# Patient Record
Sex: MALE | Race: BLACK OR AFRICAN AMERICAN | URBAN - METROPOLITAN AREA
[De-identification: names, ages, dates, MRNs, and addresses within clinical notes are randomized per-mention and may not be internally consistent; named-entity substitution may affect disease eponyms.]

---

## 2020-11-23 ENCOUNTER — OFFICE VISIT (OUTPATIENT)
Dept: PRIMARY CARE CLINIC | Age: 25
End: 2020-11-23
Payer: COMMERCIAL

## 2020-11-23 VITALS
OXYGEN SATURATION: 99 % | WEIGHT: 179 LBS | HEIGHT: 71 IN | BODY MASS INDEX: 25.06 KG/M2 | DIASTOLIC BLOOD PRESSURE: 61 MMHG | TEMPERATURE: 97.5 F | HEART RATE: 66 BPM | SYSTOLIC BLOOD PRESSURE: 126 MMHG

## 2020-11-23 PROCEDURE — 99213 OFFICE O/P EST LOW 20 MIN: CPT | Performed by: STUDENT IN AN ORGANIZED HEALTH CARE EDUCATION/TRAINING PROGRAM

## 2020-11-23 PROCEDURE — 99395 PREV VISIT EST AGE 18-39: CPT | Performed by: STUDENT IN AN ORGANIZED HEALTH CARE EDUCATION/TRAINING PROGRAM

## 2020-11-23 RX ORDER — MELOXICAM 15 MG/1
15 TABLET ORAL DAILY
Qty: 30 TABLET | Refills: 3 | Status: SHIPPED | OUTPATIENT
Start: 2020-11-23 | End: 2021-02-24 | Stop reason: SDUPTHER

## 2020-11-23 ASSESSMENT — ENCOUNTER SYMPTOMS
SHORTNESS OF BREATH: 0
BLOOD IN STOOL: 0
COUGH: 0
BACK PAIN: 0

## 2020-11-23 ASSESSMENT — PATIENT HEALTH QUESTIONNAIRE - PHQ9
SUM OF ALL RESPONSES TO PHQ9 QUESTIONS 1 & 2: 0
SUM OF ALL RESPONSES TO PHQ QUESTIONS 1-9: 0
SUM OF ALL RESPONSES TO PHQ QUESTIONS 1-9: 0
1. LITTLE INTEREST OR PLEASURE IN DOING THINGS: 0
2. FEELING DOWN, DEPRESSED OR HOPELESS: 0
SUM OF ALL RESPONSES TO PHQ QUESTIONS 1-9: 0

## 2020-11-23 NOTE — PROGRESS NOTES
2020    Mckenzie Young (:  1995) is a 23 y.o. male, here for evaluation of the following medical concerns:    HPI    Well Adult Physical: Patient here for a comprehensive physical exam.The patient reports problems -right lateral leg pain  Do you take any herbs or supplements that were not prescribed by a doctor? no Are you taking calcium supplements? no Are you taking aspirin daily? no    Sexual activity: single partner, contraception - condoms   Diet: Healthy  Exercise: aerobics 4 time(s) per week  Seatbelt use: yes    Review of Systems   Constitutional: Negative for activity change, fatigue and unexpected weight change. HENT: Negative for hearing loss. Eyes: Negative for visual disturbance. Respiratory: Negative for cough and shortness of breath. Cardiovascular: Negative for chest pain and leg swelling. Gastrointestinal: Negative for blood in stool. Endocrine: Negative for polydipsia and polyphagia. Genitourinary: Negative for dysuria and frequency. Musculoskeletal: Positive for arthralgias (R leg pain). Negative for back pain and neck pain. Skin: Negative for rash. Allergic/Immunologic: Negative for environmental allergies. Neurological: Negative for dizziness, weakness, numbness and headaches. Hematological: Does not bruise/bleed easily. Psychiatric/Behavioral: Negative for dysphoric mood and sleep disturbance. The patient is not nervous/anxious. Prior to Visit Medications    Medication Sig Taking? Authorizing Provider   meloxicam (MOBIC) 15 MG tablet Take 1 tablet by mouth daily Yes Primitivo Porras, DO        No Known Allergies    History reviewed. No pertinent past medical history. History reviewed. No pertinent surgical history.     Social History     Socioeconomic History    Marital status: Single     Spouse name: Not on file    Number of children: Not on file    Years of education: Not on file    Highest education level: Not on file   Occupational History    Not on file   Social Needs    Financial resource strain: Not on file    Food insecurity     Worry: Not on file     Inability: Not on file    Transportation needs     Medical: Not on file     Non-medical: Not on file   Tobacco Use    Smoking status: Never Smoker    Smokeless tobacco: Never Used   Substance and Sexual Activity    Alcohol use: Yes     Comment: occ    Drug use: Never    Sexual activity: Not Currently   Lifestyle    Physical activity     Days per week: Not on file     Minutes per session: Not on file    Stress: Not on file   Relationships    Social connections     Talks on phone: Not on file     Gets together: Not on file     Attends [employer] service: Not on file     Active member of club or organization: Not on file     Attends meetings of clubs or organizations: Not on file     Relationship status: Not on file    Intimate partner violence     Fear of current or ex partner: Not on file     Emotionally abused: Not on file     Physically abused: Not on file     Forced sexual activity: Not on file   Other Topics Concern    Not on file   Social History Narrative    Not on file        History reviewed. No pertinent family history. Vitals:    11/23/20 0751   BP: 126/61   Pulse: 66   Temp: 97.5 °F (36.4 °C)   TempSrc: Oral   SpO2: 99%   Weight: 179 lb (81.2 kg)   Height: 5' 10.5\" (1.791 m)     Estimated body mass index is 25.32 kg/m² as calculated from the following:    Height as of this encounter: 5' 10.5\" (1.791 m). Weight as of this encounter: 179 lb (81.2 kg). Physical Exam  Vitals signs reviewed. Constitutional:       Appearance: Normal appearance. He is normal weight. HENT:      Head: Normocephalic and atraumatic. Right Ear: Tympanic membrane normal.      Left Ear: Tympanic membrane normal.      Nose: Nose normal.      Mouth/Throat:      Mouth: Mucous membranes are moist.      Pharynx: Oropharynx is clear.    Eyes:      Extraocular Movements: Extraocular movements intact. Conjunctiva/sclera: Conjunctivae normal.      Pupils: Pupils are equal, round, and reactive to light. Neck:      Musculoskeletal: Normal range of motion and neck supple. Cardiovascular:      Rate and Rhythm: Normal rate and regular rhythm. Pulses: Normal pulses. Pulmonary:      Effort: Pulmonary effort is normal.      Breath sounds: Normal breath sounds. Abdominal:      General: Abdomen is flat. Bowel sounds are normal.      Palpations: Abdomen is soft. Musculoskeletal: Normal range of motion. General: Tenderness (With palpation over anterior-lateral lower leg, with palpation of insertion of IT band at lateral knee and iliac crest) present. No deformity. Comments: + Tonya test   Skin:     General: Skin is warm and dry. Capillary Refill: Capillary refill takes less than 2 seconds. Findings: No rash. Neurological:      General: No focal deficit present. Mental Status: He is alert and oriented to person, place, and time. Mental status is at baseline. Psychiatric:         Mood and Affect: Mood normal.         Behavior: Behavior normal.         Thought Content: Thought content normal.         Judgment: Judgment normal.       Separate Identifiable issues addressed today:  Right leg pain: Patient presents today for evaluation of right-sided leg pain. The pain started about 4 months ago. Patient states he recently picked up jogging as a way to stay healthy. The pain did not occur with a specific injury, he recalls more that he came home for a run and as he cooled down he noted some discomfort. The pain gradually increased until he was unable to run. He did take some time off from running and noticed it improved, but then went for a jog recently and the pain immediately returned. He also complains of pain on the anterior lateral lower leg that started after the upper leg pain.   He has been taking over-the-counter ibuprofen and it is helped with his

## 2020-11-23 NOTE — PATIENT INSTRUCTIONS
Patient Education        Well Visit, Ages 25 to 48: Care Instructions  Your Care Instructions     Physical exams can help you stay healthy. Your doctor has checked your overall health and may have suggested ways to take good care of yourself. He or she also may have recommended tests. At home, you can help prevent illness with healthy eating, regular exercise, and other steps. Follow-up care is a key part of your treatment and safety. Be sure to make and go to all appointments, and call your doctor if you are having problems. It's also a good idea to know your test results and keep a list of the medicines you take. How can you care for yourself at home? · Reach and stay at a healthy weight. This will lower your risk for many problems, such as obesity, diabetes, heart disease, and high blood pressure. · Get at least 30 minutes of physical activity on most days of the week. Walking is a good choice. You also may want to do other activities, such as running, swimming, cycling, or playing tennis or team sports. Discuss any changes in your exercise program with your doctor. · Do not smoke or allow others to smoke around you. If you need help quitting, talk to your doctor about stop-smoking programs and medicines. These can increase your chances of quitting for good. · Talk to your doctor about whether you have any risk factors for sexually transmitted infections (STIs). Having one sex partner (who does not have STIs and does not have sex with anyone else) is a good way to avoid these infections. · Use birth control if you do not want to have children at this time. Talk with your doctor about the choices available and what might be best for you. · Protect your skin from too much sun. When you're outdoors from 10 a.m. to 4 p.m., stay in the shade or cover up with clothing and a hat with a wide brim. Wear sunglasses that block UV rays.  Even when it's cloudy, put broad-spectrum sunscreen (SPF 30 or higher) on any exposed skin. · See a dentist one or two times a year for checkups and to have your teeth cleaned. · Wear a seat belt in the car. Follow your doctor's advice about when to have certain tests. These tests can spot problems early. For everyone  · Cholesterol. Have the fat (cholesterol) in your blood tested after age 21. Your doctor will tell you how often to have this done based on your age, family history, or other things that can increase your risk for heart disease. · Blood pressure. Have your blood pressure checked during a routine doctor visit. Your doctor will tell you how often to check your blood pressure based on your age, your blood pressure results, and other factors. · Vision. Talk with your doctor about how often to have a glaucoma test.  · Diabetes. Ask your doctor whether you should have tests for diabetes. · Colon cancer. Your risk for colorectal cancer gets higher as you get older. Some experts say that adults should start regular screening at age 48 and stop at age 76. Others say to start before age 48 or continue after age 76. Talk with your doctor about your risk and when to start and stop screening. For women  · Breast exam and mammogram. Talk to your doctor about when you should have a clinical breast exam and a mammogram. Medical experts differ on whether and how often women under 50 should have these tests. Your doctor can help you decide what is right for you. · Cervical cancer screening test and pelvic exam. Begin with a Pap test at age 24. The test often is part of a pelvic exam. Starting at age 27, you may choose to have a Pap test, an HPV test, or both tests at the same time (called co-testing). Talk with your doctor about how often to have testing. · Tests for sexually transmitted infections (STIs). Ask whether you should have tests for STIs. You may be at risk if you have sex with more than one person, especially if your partners do not wear condoms.   For men  · Tests for hold on to a chair or counter. 2. Stand on the leg with the affected hip, with that leg close to the wall. Then cross your other leg in front of it. 3. Let your affected hip drop out to the side of your body and against the wall. Then lean away from your affected hip until you feel a stretch. 4. Hold the stretch for 15 to 30 seconds. 5. Repeat 2 to 4 times. Piriformis stretch   1. Lie on your back with your legs straight. 2. Lift your affected leg and bend your knee. With your opposite hand, reach across your body, and then gently pull your knee toward your opposite shoulder. 3. Hold the stretch for 15 to 30 seconds. 4. Repeat 2 to 4 times. Hamstring wall stretch   1. Lie on your back in a doorway, with your good leg through the open door. 2. Slide your affected leg up the wall to straighten your knee. You should feel a gentle stretch down the back of your leg. 3. Hold the stretch for at least 1 minute to begin. Then try to lengthen the time you hold the stretch to as long as 6 minutes. 4. Repeat 2 to 4 times. 5. If you do not have a place to do this exercise in a doorway, there is another way to do it:  6. Lie on your back, and bend the knee of your affected leg. 7. Loop a towel under the ball and toes of that foot, and hold the ends of the towel in your hands. 8. Straighten your knee, and slowly pull back on the towel. You should feel a gentle stretch down the back of your leg. 9. Hold the stretch for 15 to 30 seconds. Or even better, hold the stretch for 1 minute if you can. 10. Repeat 2 to 4 times. 1. Do not arch your back. 2. Do not bend either knee. 3. Keep one heel touching the floor and the other heel touching the wall. Do not point your toes. Follow-up care is a key part of your treatment and safety. Be sure to make and go to all appointments, and call your doctor if you are having problems.  It's also a good idea to know your test results and keep a list of the medicines you take.  Where can you learn more? Go to https://[email or web address].[email or web address] org and sign in to your [employer] account. Enter P252 in the [employer] box to learn more about \"Iliotibial Band Syndrome: Exercises. \"     If you do not have an account, please click on the \"Sign Up Now\" link. Current as of: March 2, 2020               Content Version: 12.6  © 2006-2020 [employer]. Care instructions adapted under license by [facility] ([facility]). If you have questions about a medical condition or this instruction, always ask your healthcare professional. [address] any warranty or liability for your use of this information.

## 2020-11-27 ENCOUNTER — HOSPITAL ENCOUNTER (OUTPATIENT)
Dept: PHYSICAL THERAPY | Age: 25
Setting detail: THERAPIES SERIES
Discharge: HOME | End: 2020-11-27
Payer: COMMERCIAL

## 2020-11-27 PROCEDURE — 97110 THERAPEUTIC EXERCISES: CPT

## 2020-11-27 PROCEDURE — 97161 PT EVAL LOW COMPLEX 20 MIN: CPT

## 2020-11-27 NOTE — PLAN OF CARE
[facility] Orthopaedics and [address] [place] [address]; [facility]          Physical Therapy Certification    Dear Referring Practitioner: Dr. Michael Ma,    We had the pleasure of evaluating the following patient for physical therapy services at [address] [place] [address]. A summary of our findings can be found in the initial assessment below. This includes our plan of care. If you have any questions or concerns regarding these findings, please do not hesitate to contact me at the office phone number checked above. Thank you for the referral.       Physician Signature:_______________________________Date:__________________  By signing above (or electronic signature), therapists plan is approved by physician    Patient: Kenton Silva   : 1995   MRN: [id number]  Referring Physician: Referring Practitioner: Dr. Michael Ma      Evaluation Date: 2020      Medical Diagnosis Information:  Diagnosis: M76.31 R ITB tendonitis   Treatment Diagnosis: M25.651 R hip stiffness, M76.31 R ITBS                                         Insurance information: PT Insurance Information: [place], 40 visits, no auth, telehealth yes       Precautions/ Contra-indications: None    C-SSRS Triggered by Intake questionnaire (Past 2 wk assessment):   [x] No, Questionnaire did not trigger screening.   [] Yes, Patient intake triggered further evaluation      [] C-SSRS Screening completed  [] PCP notified via Plan of Care  [] Emergency services notified     Latex Allergy:  [x]NO      []YES  Preferred Language for Healthcare:   [x]English       []other:    SUBJECTIVE: Patient stated complaint:Pt usually runs 3-5 miles 3-4 times a week.  He then helped girlfriend move and started having back pain and so thought the leg pain was sciatica but then everything started getting better but then in past two weeks he notice leg pain is getting worse again so saw MD. He diagnosed ITB syndrome but also has some lateral shin \"shin splint\" in the R LE. Denies L LE pains, denies numbness/tingling. Pt currently has pain and difficulty with sitting more than 5 min, sleeping, but able to walk and stand okay. He does have to take stairs more slowly but able to do them. He admits that he is not the best at stretching and when he does stretch its usually before running. Relevant Medical History:none   Functional Disability Index: 55/80 = 69% ability, 31% disability    Height 5'10\" Weight 179 lbs  Pain Scale: 6/10  Easing factors: stretching  Provocative factors: sitting, sleeping     Type: []Constant   [x]Intermittent  []Radiating []Localized []other:     Numbness/Tingling:denies    Occupation/School: [employer]     Living Status/Prior Level of Function: Independent with ADLs and IADLs, running, weight lifting, cycling    OBJECTIVE:     ROM LEFT RIGHT   HIP Flex WNL    HIP Abd WNL    HIP Ext     HIP IR     HIP ER     Hamstring SLR 75 deg 60 deg   Piriformis WNL tight   Gastroc DF angle 20 10   Knee ext WNL WNL   Knee Flex WNL WNL   Ankle PF     Ankle DF     Ankle In     Ankle Ev     Strength  LEFT RIGHT   HIP Flexors     HIP Abductors  4+   HIP Ext     Hip ER  4+    Knee EXT (quad)  4   Knee Flex (HS)  4+   Ankle DF     Ankle PF     Ankle Inv     Ankle EV          Circumference  Mid apex  7 cm prox             Reflexes/Sensation:    []Dermatomes/Myotomes intact    [x]Reflexes equal and normal bilaterally   []Other:    Joint mobility:    [x]Normal    []Hypo   []Hyper    Palpation: tender and tight along R ITB and peroneal group    Functional Mobility/Transfers: WFL    Posture:  WNL    Bandages/Dressings/Incisions: none    Gait: (include devices/WB status) WB more through lateral aspect of leg    Orthopedic Special Tests: + Alex Apodaca, + Joseph for tight quad, ITB, and hip flexor Right side more so than left, + SLR in R LE for possible radicular symptoms/ neural tension [x] Patient history, allergies, meds reviewed. Medical chart reviewed. See intake form. Review Of Systems (ROS):  [x]Performed Review of systems (Integumentary, CardioPulmonary, Neurological) by intake and observation. Intake form has been scanned into medical record. Patient has been instructed to contact their primary care physician regarding ROS issues if not already being addressed at this time. Co-morbidities/Complexities (which will affect course of rehabilitation):   [x]None           Arthritic conditions   []Rheumatoid arthritis (M05.9)  []Osteoarthritis (M19.91)   Cardiovascular conditions   []Hypertension (I10)  []Hyperlipidemia (E78.5)  []Angina pectoris (I20)  []Atherosclerosis (I70)   Musculoskeletal conditions   []Disc pathology   []Congenital spine pathologies   []Prior surgical intervention  []Osteoporosis (M81.8)  []Osteopenia (M85.8)   Endocrine conditions   []Hypothyroid (E03.9)  []Hyperthyroid Gastrointestinal conditions   []Constipation (B16.27)   Metabolic conditions   []Morbid obesity (E66.01)  []Diabetes type 1(E10.65) or 2 (E11.65)   []Neuropathy (G60.9)     Pulmonary conditions   []Asthma (J45)  []Coughing   []COPD (J44.9)   Psychological Disorders  []Anxiety (F41.9)  []Depression (F32.9)   []Other:   []Other:          Barriers to/and or personal factors that will affect rehab potential:              []Age  []Sex              []Motivation/Lack of Motivation                        []Co-Morbidities              []Cognitive Function, education/learning barriers              []Environmental, home barriers              []profession/work barriers  []past PT/medical experience  []other:  Justification:     Falls Risk Assessment (30 days):   [x] Falls Risk assessed and no intervention required.   [] Falls Risk assessed and Patient requires intervention due to being higher risk   TUG score (>12s at risk):     [] Falls education provided, including           ASSESSMENT: Pt demonstrates fairly severe inflexibility in B LE but more so in R LE particularly in gastroc, hamstring and ITB. He has mild weakness in R quad and hip abd, ambulates more on lateral aspect of the LE. He does have some + neural tension but may be more peripheral impingment due to his about of tightness in LE but if does not resolve as the LE improved I will refer back to MD to ensure nothing in lumbar spine is the cause. Pt will benefit from PT to address these impairments along with education on proper stretching, warm-up and cool-down techniques. Functional Impairments:     []Noted lumbar/proximal hip/LE joint hypomobility   [x]Decreased LE functional ROM   [x]Decreased core/proximal hip strength and neuromuscular control   [x]Decreased LE functional strength   []Reduced balance/proprioceptive control   []other:      Functional Activity Limitations (from functional questionnaire and intake)   [x]Reduced ability to tolerate prolonged functional positions   []Reduced ability or difficulty with changes of positions or transfers between positions   []Reduced ability to maintain good posture and demonstrate good body mechanics with sitting, bending, and lifting   [x]Reduced ability to sleep   [x] Reduced ability or tolerance with driving and/or computer work   []Reduced ability to perform lifting, carrying tasks   []Reduced ability to squat   []Reduced ability to forward bend   []Reduced ability to ambulate prolonged functional periods/distances/surfaces   [x]Reduced ability to ascend/descend stairs   [x]Reduced ability to run, hop, cut or jump   []other:    Participation Restrictions   []Reduced participation in self care activities   []Reduced participation in home management activities   [x]Reduced participation in work activities   [x]Reduced participation in social activities. [x]Reduced participation in sport/recreation activities.     Classification :    []Signs/symptoms consistent with post-surgical status including without increased symptoms or restriction.    [] Progressing: [] Met: [] Not Met: [] Adjusted     Electronically signed by:  Orville Villegas PT

## 2020-11-27 NOTE — FLOWSHEET NOTE
Therapeutic Activity (52935)                                         Therapeutic Exercise and NMR EXR  [x] (69278) Provided verbal/tactile cueing for activities related to strengthening, flexibility, endurance, ROM for improvements in LE, proximal hip, and core control with self care, mobility, lifting, ambulation.  [] (04720) Provided verbal/tactile cueing for activities related to improving balance, coordination, kinesthetic sense, posture, motor skill, proprioception  to assist with LE, proximal hip, and core control in self care, mobility, lifting, ambulation and eccentric single leg control.      NMR and Therapeutic Activities:    [] (19555 or 39820) Provided verbal/tactile cueing for activities related to improving balance, coordination, kinesthetic sense, posture, motor skill, proprioception and motor activation to allow for proper function of core, proximal hip and LE with self care and ADLs  [] (30318) Gait Re-education- Provided training and instruction to the patient for proper LE, core and proximal hip recruitment and positioning and eccentric body weight control with ambulation re-education including up and down stairs     Home Exercise Program:    [x] (11014) Reviewed/Progressed HEP activities related to strengthening, flexibility, endurance, ROM of core, proximal hip and LE for functional self-care, mobility, lifting and ambulation/stair navigation   [] (65441)Reviewed/Progressed HEP activities related to improving balance, coordination, kinesthetic sense, posture, motor skill, proprioception of core, proximal hip and LE for self care, mobility, lifting, and ambulation/stair navigation      Manual Treatments:  PROM / STM / Oscillations-Mobs:  G-I, II, III, IV (PA's, Inf., Post.)  [] (03602) Provided manual therapy to mobilize LE, proximal hip and/or LS spine soft tissue/joints for the purpose of modulating pain, promoting relaxation,  increasing ROM, reducing/eliminating Progressing: [] Met: [] Not Met: [] Adjusted  5. Pt will be able to run 2 miles without increased symptoms or restriction. [] Progressing: [] Met: [] Not Met: [] Adjusted     Progression Towards Functional goals:  [] Patient is progressing as expected towards functional goals listed. [] Progression is slowed due to complexities listed. [] Progression has been slowed due to co-morbidities. [x] Plan just implemented, too soon to assess goals progression  [] Other:         Overall Progression Towards Functional goals/ Treatment Progress Update:  [] Patient is progressing as expected towards functional goals listed. [] Progression is slowed due to complexities/Impairments listed. [] Progression has been slowed due to co-morbidities. [x] Plan just implemented, too soon to assess goals progression <30days   [] Goals require adjustment due to lack of progress  [] Patient is not progressing as expected and requires additional follow up with physician  [] Other    Prognosis for POC: [x] Good [] Fair  [] Poor      Patient requires continued skilled intervention: [x] Yes  [] No    Treatment/Activity Tolerance:  [x] Patient able to complete treatment  [] Patient limited by fatigue  [] Patient limited by pain    [] Patient limited by other medical complications  [] Other:     ASSESSMENT: Pt was surprised but just how inflexible he really is and found the information I gave him about proper stretch techniques to be useful and help him get more out of his stretching.      Return to Play: (if applicable)   []  Stage 1: Intro to Strength   []  Stage 2: Return to Run and Strength   []  Stage 3: Return to Jump and Strength   []  Stage 4: Dynamic Strength and Agility   []  Stage 5: Sport Specific Training     []  Ready to Return to Play, Meets All Above Stages   []  Not Ready for Return to Sports   Comments:                               PLAN: See eval. Next visit: MFR ITB and calf, how to properly roll out, add in hip flexor stretch and quad stretch  [] Continue per plan of care [] Alter current plan (see comments above)  [x] Plan of care initiated [] Hold pending MD visit [] Discharge      Electronically signed by:  Marvin Vazquez PT    Note: If patient does not return for scheduled/ recommended follow up visits, this note will serve as a discharge from care along with most recent update on progress.

## 2020-12-04 ENCOUNTER — HOSPITAL ENCOUNTER (OUTPATIENT)
Dept: PHYSICAL THERAPY | Age: 25
Setting detail: THERAPIES SERIES
Discharge: HOME | End: 2020-12-04
Payer: COMMERCIAL

## 2020-12-04 PROCEDURE — 97140 MANUAL THERAPY 1/> REGIONS: CPT

## 2020-12-04 NOTE — FLOWSHEET NOTE
ambulation/stair navigation      Manual Treatments:  PROM / STM / Oscillations-Mobs:  G-I, II, III, IV (PA's, Inf., Post.)  [x] (50415) Provided manual therapy to mobilize LE, proximal hip and/or LS spine soft tissue/joints for the purpose of modulating pain, promoting relaxation,  increasing ROM, reducing/eliminating soft tissue swelling/inflammation/restriction, improving soft tissue extensibility and allowing for proper ROM for normal function with self care, mobility, lifting and ambulation. Modalities:     [] GAME READY (VASO)- for significant edema, swelling, pain control. Charges:  Timed Code Treatment Minutes: 45 min   Total Treatment Minutes: 45 min       [] EVAL (LOW) 50148   [] EVAL (MOD) 60505  [] EVAL (HIGH) 16789   [] RE-EVAL     [] LW(92464) x    [] IONTO  [] NMR (96003) x     [] VASO  [x] Manual (34804) x 3     [] Other:  [] TA x      [] Mech Traction (54837)  [] ES(attended) (02895)      [] ES (un) (20536):       GOALS:  Patient stated goal: get back to sitting comfortably  [] Progressing: [] Met: [] Not Met: [] Adjusted    Therapist goals for Patient:   Short Term Goals: To be achieved in: 2 weeks  1. Independent in HEP and progression per patient tolerance, in order to prevent re-injury. [] Progressing: [] Met: [] Not Met: [] Adjusted  2. Patient will have a decrease in pain to facilitate improvement in movement, function, and ADLs as indicated by Functional Deficits. [] Progressing: [] Met: [] Not Met: [] Adjusted    Long Term Goals: To be achieved in: 6 weeks  1. Disability index score of 10% or less for the LEFS to assist with reaching prior level of function. [] Progressing: [] Met: [] Not Met: [] Adjusted  2. Patient will demonstrate increased in LE flexibility by at least 25 deg with - flexibility special tests to allow for proper joint functioning as indicated by patients Functional Deficits. [] Progressing: [] Met: [] Not Met: [] Adjusted  3.  Patient will demonstrate an increase in Strength to good proximal hip strength and control, within 5lb HHD in LE to allow for proper functional mobility as indicated by patients Functional Deficits. [] Progressing: [] Met: [] Not Met: [] Adjusted  4. Patient will be able to sit for at least 2 hours without increased symptoms or restriction. [] Progressing: [] Met: [] Not Met: [] Adjusted  5. Pt will be able to run 2 miles without increased symptoms or restriction. [] Progressing: [] Met: [] Not Met: [] Adjusted     Progression Towards Functional goals:  [] Patient is progressing as expected towards functional goals listed. [] Progression is slowed due to complexities listed. [] Progression has been slowed due to co-morbidities. [x] Plan just implemented, too soon to assess goals progression  [] Other:         Overall Progression Towards Functional goals/ Treatment Progress Update:  [] Patient is progressing as expected towards functional goals listed. [] Progression is slowed due to complexities/Impairments listed. [] Progression has been slowed due to co-morbidities. [x] Plan just implemented, too soon to assess goals progression <30days   [] Goals require adjustment due to lack of progress  [] Patient is not progressing as expected and requires additional follow up with physician  [] Other    Prognosis for POC: [x] Good [] Fair  [] Poor      Patient requires continued skilled intervention: [x] Yes  [] No    Treatment/Activity Tolerance:  [x] Patient able to complete treatment  [] Patient limited by fatigue  [] Patient limited by pain    [] Patient limited by other medical complications  [] Other:     ASSESSMENT: Pt reported leg \"feels good, more flexible\" after the manual work today. Pt also reported that the knowledge I've given him for proper stretching, rolling, and other training habits he feels are going to be a huge help.      Return to Play: (if applicable)   []  Stage 1: Intro to Strength   []  Stage 2: Return to Run and Strength   []  Stage 3: Return to Jump and Strength   []  Stage 4: Dynamic Strength and Agility   []  Stage 5: Sport Specific Training     []  Ready to Return to Play, Meets All Above Stages   []  Not Ready for Return to Sports   Comments:                               PLAN: See eval. Next visit: R hip strength  [x] Continue per plan of care [] Alter current plan (see comments above)  [] Plan of care initiated [] Hold pending MD visit [] Discharge      Electronically signed by:  Odella Snellen, PT    Note: If patient does not return for scheduled/ recommended follow up visits, this note will serve as a discharge from care along with most recent update on progress.

## 2020-12-11 ENCOUNTER — HOSPITAL ENCOUNTER (OUTPATIENT)
Dept: PHYSICAL THERAPY | Age: 25
Setting detail: THERAPIES SERIES
Discharge: HOME | End: 2020-12-11
Payer: COMMERCIAL

## 2020-12-11 PROCEDURE — 97110 THERAPEUTIC EXERCISES: CPT

## 2020-12-11 NOTE — FLOWSHEET NOTE
[facility] Orthopaedics [facility] Sports Rehabilitation; [facility]       Physical Therapy Treatment Note/ Progress Report:           Date:  2020    Patient Name:  Edilberto Ching    :  1995  MRN: [id number]  Restrictions/Precautions:    Medical/Treatment Diagnosis Information:  Diagnosis: M76.31 R ITB tendonitis  Treatment Diagnosis: M25.651 R hip stiffness, M76.31 R ITBS  Insurance/Certification information:  PT Insurance Information: [place], 40 visits, no auth, telehealth yes  Physician Information:  Referring Practitioner: Dr. Mary Beth Fuentes  Has the plan of care been signed (Y/N):        []  Yes  [x]  No     Date of Patient follow up with Physician: as needed      Is this a Progress Report:     []  Yes  [x]  No        If Yes:  Date Range for reporting period:  Beginning 20  Ending    Progress report will be due (10 Rx or 30 days whichever is less):        Recertification will be due (POC Duration  / 90 days whichever is less): 21      Visit # Insurance Allowable Auth Required   In-person 3 40 visits []  Yes [x]  No    Telehealth - yes []  Yes [x]  No    Total 3         Functional Scale: LEFS: 55/80 = 69% ability, 31% disability   Date assessed:  20       Number of Comorbidities:  []0     []1-2    []3+    Latex Allergy:  [x]NO      []YES  Preferred Language for Healthcare:   [x]English       []other:      Pain level:  0/10     SUBJECTIVE:  Pt reports that overall he feels 90% improved with reduce pain and tightness. He has noticed he can sit and work for longer periods of time without pain in lateral thigh or lateral shin. He does still notice it though with driving.     OBJECTIVE:    Observation:    Test measurements:  Hamstring SLR: R 70 deg, L 80 deg    RESTRICTIONS/PRECAUTIONS: none    Exercises/Interventions:     Therapeutic Ex (41246) Sets/Reps/ sec Notes/CUES   Suipne HS stretch 1x30\"R/L HEP    Supine ITB stretch 1x30\"R/L HEP   Fig 4 stretch Progressing: [] Met: [] Not Met: [] Adjusted  2. Patient will demonstrate increased in LE flexibility by at least 25 deg with - flexibility special tests to allow for proper joint functioning as indicated by patients Functional Deficits. [] Progressing: [] Met: [] Not Met: [] Adjusted  3. Patient will demonstrate an increase in Strength to good proximal hip strength and control, within 5lb HHD in LE to allow for proper functional mobility as indicated by patients Functional Deficits. [] Progressing: [] Met: [] Not Met: [] Adjusted  4. Patient will be able to sit for at least 2 hours without increased symptoms or restriction. [] Progressing: [] Met: [] Not Met: [] Adjusted  5. Pt will be able to run 2 miles without increased symptoms or restriction. [] Progressing: [] Met: [] Not Met: [] Adjusted     Progression Towards Functional goals:  [x] Patient is progressing as expected towards functional goals listed. [] Progression is slowed due to complexities listed. [] Progression has been slowed due to co-morbidities. [] Plan just implemented, too soon to assess goals progression  [] Other:         Overall Progression Towards Functional goals/ Treatment Progress Update:  [x] Patient is progressing as expected towards functional goals listed. [] Progression is slowed due to complexities/Impairments listed. [] Progression has been slowed due to co-morbidities.   [] Plan just implemented, too soon to assess goals progression <30days   [] Goals require adjustment due to lack of progress  [] Patient is not progressing as expected and requires additional follow up with physician  [] Other    Prognosis for POC: [x] Good [] Fair  [] Poor      Patient requires continued skilled intervention: [x] Yes  [] No    Treatment/Activity Tolerance:  [x] Patient able to complete treatment  [] Patient limited by fatigue  [] Patient limited by pain    [] Patient limited by other medical complications  [] Other:     ASSESSMENT: Pt was surprised with how his hip fatigued with the exercises today but did not increased any pain. Return to Play: (if applicable)   []  Stage 1: Intro to Strength   []  Stage 2: Return to Run and Strength   []  Stage 3: Return to Jump and Strength   []  Stage 4: Dynamic Strength and Agility   []  Stage 5: Sport Specific Training     []  Ready to Return to Play, Meets All Above Stages   []  Not Ready for Return to Sports   Comments:                               PLAN: See eval. Next visit: consider D/C next visit or so. [x] Continue per plan of care [] Alter current plan (see comments above)  [] Plan of care initiated [] Hold pending MD visit [] Discharge      Electronically signed by:  Martir Callaway PT    Note: If patient does not return for scheduled/ recommended follow up visits, this note will serve as a discharge from care along with most recent update on progress.

## 2020-12-18 ENCOUNTER — HOSPITAL ENCOUNTER (OUTPATIENT)
Dept: PHYSICAL THERAPY | Age: 25
Setting detail: THERAPIES SERIES
Discharge: HOME | End: 2020-12-18
Payer: COMMERCIAL

## 2020-12-18 PROCEDURE — 97140 MANUAL THERAPY 1/> REGIONS: CPT

## 2020-12-18 NOTE — FLOWSHEET NOTE
[facility] Orthopaedics and Sports Rehabilitation; [facility]       Physical Therapy Treatment Note/ Progress Report:           Date:  2020    Patient Name:  Ofelia Greer    :  1995  MRN: [id number]  Restrictions/Precautions:    Medical/Treatment Diagnosis Information:  Diagnosis: M76.31 R ITB tendonitis  Treatment Diagnosis: M25.651 R hip stiffness, M76.31 R ITBS  Insurance/Certification information:  PT Insurance Information: [place], 40 visits, no auth, telehealth yes  Physician Information:  Referring Practitioner: Dr. Pipe Sorto  Has the plan of care been signed (Y/N):        []  Yes  [x]  No     Date of Patient follow up with Physician: as needed      Is this a Progress Report:     []  Yes  [x]  No        If Yes:  Date Range for reporting period:  Beginning 20  Ending    Progress report will be due (10 Rx or 30 days whichever is less):        Recertification will be due (POC Duration  / 90 days whichever is less): 21      Visit # Insurance Allowable Auth Required   In-person 4 40 visits []  Yes [x]  No    Telehealth - yes []  Yes [x]  No    Total 4         Functional Scale: LEFS: 55/80 = 69% ability, 31% disability   Date assessed:  20       Number of Comorbidities:  []0     []1-2    []3+    Latex Allergy:  [x]NO      []YES  Preferred Language for Healthcare:   [x]English       []other:      Pain level:  0/10     SUBJECTIVE:  Pt says the ITB has been feeling a lot better for whatever reason his anterolateral shin has been really bothering him. Because of that he did not try running.       OBJECTIVE:    Observation: R prox fibular hypomobile posterior glide   Test measurements:  Hamstring SLR: R 70 deg, L 80 deg     RESTRICTIONS/PRECAUTIONS: none    Exercises/Interventions:     Therapeutic Ex (89401) Sets/Reps/ sec Notes/CUES   Suipne HS stretch HEP    Supine ITB stretch HEP   Fig 4 stretch HEP   Long sit gastroc stretch strap HEP Hip flexor stretch, standing HEP   Quad stretch, standing HEP       Clamshell HEP 12/11   S/L hip abd HEP 12/11   Bridge w/ marching HEP 12/11   Bridge SL HEP 12/11   Steamboats, R stance HEP 12/11   Side stepping HEP 12/11        Pt edu 2 min Adjusting car seat to help with tension developing while he is driving. Manual Intervention ([id number])     MFR: R ITB, R ant tib and peroneals 5 min   Foam roller: quads, ITB, calf, ant tib    Prox fib post glide gd3-4, mob with motion (DF) 6 min    Cupping lateral and anterior R lower leg 30 min              NMR re-education (26012)  CUES NEEDED                                                Therapeutic Activity (25450)                                         Therapeutic Exercise and NMR EXR  [] (55517) Provided verbal/tactile cueing for activities related to strengthening, flexibility, endurance, ROM for improvements in LE, proximal hip, and core control with self care, mobility, lifting, ambulation.  [] (38376) Provided verbal/tactile cueing for activities related to improving balance, coordination, kinesthetic sense, posture, motor skill, proprioception  to assist with LE, proximal hip, and core control in self care, mobility, lifting, ambulation and eccentric single leg control.      NMR and Therapeutic Activities:    [] (60563 or 25936) Provided verbal/tactile cueing for activities related to improving balance, coordination, kinesthetic sense, posture, motor skill, proprioception and motor activation to allow for proper function of core, proximal hip and LE with self care and ADLs  [] (09880) Gait Re-education- Provided training and instruction to the patient for proper LE, core and proximal hip recruitment and positioning and eccentric body weight control with ambulation re-education including up and down stairs     Home Exercise Program:    [] (32328) Reviewed/Progressed HEP activities related to strengthening, flexibility, endurance, ROM of core, proximal hip and LE for functional self-care, mobility, lifting and ambulation/stair navigation   [] (27752)Reviewed/Progressed HEP activities related to improving balance, coordination, kinesthetic sense, posture, motor skill, proprioception of core, proximal hip and LE for self care, mobility, lifting, and ambulation/stair navigation      Manual Treatments:  PROM / STM / Oscillations-Mobs:  G-I, II, III, IV (PA's, Inf., Post.)  [x] (78539) Provided manual therapy to mobilize LE, proximal hip and/or LS spine soft tissue/joints for the purpose of modulating pain, promoting relaxation,  increasing ROM, reducing/eliminating soft tissue swelling/inflammation/restriction, improving soft tissue extensibility and allowing for proper ROM for normal function with self care, mobility, lifting and ambulation. Modalities:     [] GAME READY (VASO)- for significant edema, swelling, pain control. Charges:  Timed Code Treatment Minutes: 45 min   Total Treatment Minutes: 45 min       [] EVAL (LOW) 51082   [] EVAL (MOD) 68445  [] EVAL (HIGH) 08338   [] RE-EVAL     [] JE(67220) x    [] IONTO  [] NMR (43619) x     [] VASO  [x] Manual (01170) x 3     [] Other:  [] TA x      [] Mech Traction (91504)  [] ES(attended) (76337)      [] ES (un) (74644):       GOALS:  Patient stated goal: get back to sitting comfortably  [x] Progressing: [] Met: [] Not Met: [] Adjusted    Therapist goals for Patient:   Short Term Goals: To be achieved in: 2 weeks  1. Independent in HEP and progression per patient tolerance, in order to prevent re-injury. [] Progressing: [x] Met: [] Not Met: [] Adjusted  2. Patient will have a decrease in pain to facilitate improvement in movement, function, and ADLs as indicated by Functional Deficits. [] Progressing: [x] Met: [] Not Met: [] Adjusted    Long Term Goals: To be achieved in: 6 weeks  1. Disability index score of 10% or less for the LEFS to assist with reaching prior level of function.    [] Progressing: [] Met: [] Not Met: [] Adjusted  2. Patient will demonstrate increased in LE flexibility by at least 25 deg with - flexibility special tests to allow for proper joint functioning as indicated by patients Functional Deficits. [] Progressing: [] Met: [] Not Met: [] Adjusted  3. Patient will demonstrate an increase in Strength to good proximal hip strength and control, within 5lb HHD in LE to allow for proper functional mobility as indicated by patients Functional Deficits. [] Progressing: [] Met: [] Not Met: [] Adjusted  4. Patient will be able to sit for at least 2 hours without increased symptoms or restriction. [] Progressing: [] Met: [] Not Met: [] Adjusted  5. Pt will be able to run 2 miles without increased symptoms or restriction. [] Progressing: [] Met: [] Not Met: [] Adjusted     Progression Towards Functional goals:  [x] Patient is progressing as expected towards functional goals listed. [] Progression is slowed due to complexities listed. [] Progression has been slowed due to co-morbidities. [] Plan just implemented, too soon to assess goals progression  [] Other:         Overall Progression Towards Functional goals/ Treatment Progress Update:  [x] Patient is progressing as expected towards functional goals listed. [] Progression is slowed due to complexities/Impairments listed. [] Progression has been slowed due to co-morbidities.   [] Plan just implemented, too soon to assess goals progression <30days   [] Goals require adjustment due to lack of progress  [] Patient is not progressing as expected and requires additional follow up with physician  [] Other    Prognosis for POC: [x] Good [] Fair  [] Poor      Patient requires continued skilled intervention: [x] Yes  [] No    Treatment/Activity Tolerance:  [x] Patient able to complete treatment  [] Patient limited by fatigue  [] Patient limited by pain    [] Patient limited by other medical complications  [] Other:     ASSESSMENT: Able to normalize soft tissue mobility restricting joint mobility as well as joint mobility directing through joint mobilization to allow for symmetrical LE prox fibular gliding which then alleviated the pain the pt was experiencing in his shin. The Tight ITB probably had restricted the prox fib mobility but still needed to be address directly on its own as well once dysfunctional.      Return to Play: (if applicable)   []  Stage 1: Intro to Strength   []  Stage 2: Return to Run and Strength   []  Stage 3: Return to Jump and Strength   []  Stage 4: Dynamic Strength and Agility   []  Stage 5: Sport Specific Training     []  Ready to Return to Play, Meets All Above Stages   []  Not Ready for Return to Sports   Comments:                               PLAN: See eval. Next visit: progress hip strengthening and start discussing D/C plans  [x] Continue per plan of care [] Alter current plan (see comments above)  [] Plan of care initiated [] Hold pending MD visit [] Discharge      Electronically signed by:  Tristan Mcmullen, PT    Note: If patient does not return for scheduled/ recommended follow up visits, this note will serve as a discharge from care along with most recent update on progress.

## 2020-12-23 ENCOUNTER — HOSPITAL ENCOUNTER (OUTPATIENT)
Dept: PHYSICAL THERAPY | Age: 25
Setting detail: THERAPIES SERIES
Discharge: HOME | End: 2020-12-23
Payer: COMMERCIAL

## 2020-12-23 PROCEDURE — 97110 THERAPEUTIC EXERCISES: CPT

## 2020-12-23 NOTE — FLOWSHEET NOTE
[facility] Orthopaedics and Sports Rehabilitation; [place]       Physical Therapy Treatment Note/ Progress Report:         Date:  2020    Patient Name:  Kenton Silva    :  1995  MRN: [id number]  Restrictions/Precautions:    Medical/Treatment Diagnosis Information:  Diagnosis: M76.31 R ITB tendonitis  Treatment Diagnosis: M25.651 R hip stiffness, M76.31 R ITBS  Insurance/Certification information:  PT Insurance Information: [place], 40 visits, no auth, telehealth yes  Physician Information:  Referring Practitioner: Dr. Michael Ma  Has the plan of care been signed (Y/N):        []  Yes  [x]  No     Date of Patient follow up with Physician: as needed      Is this a Progress Report:     []  Yes  [x]  No        If Yes:  Date Range for reporting period:  Beginning 20  Ending    Progress report will be due (10 Rx or 30 days whichever is less):        Recertification will be due (POC Duration  / 90 days whichever is less): 21      Visit # Insurance Allowable Auth Required   In-person 5 40 visits []  Yes [x]  No    Telehealth - yes []  Yes [x]  No    Total 5         Functional Scale: LEFS: 55/80 = 69% ability, 31% disability   Date assessed:  20       Number of Comorbidities:  []0     []1-2    []3+    Latex Allergy:  [x]NO      []YES  Preferred Language for Healthcare:   [x]English       []other:      Pain level:  0/10     SUBJECTIVE:  Pt reports he ran 1 mile yesterday and reports that the ITB and shin felt fine. His shin/calf has been feeling better since last visit.     OBJECTIVE:    Observation:    Test measurements:       RESTRICTIONS/PRECAUTIONS: none    Exercises/Interventions:     Therapeutic Ex (44657) Sets/Reps/ sec Notes/CUES   Suipne HS stretch 1x30\"R/L HEP    Supine ITB stretch 1x30\"R/L HEP   Fig 4 stretch 1x30\"R/L HEP   HS stretch foot on stair 2x30\"    Standing ITB wall stretch 2x30\"    Long sit gastroc stretch strap HEP   Hip flexor stretch, standing HEP   Quad stretch, standing HEP   Clamshell feet up w/ band 2x10 R/L    Clamshell w/ band 1x15 HEP 12/11   S/L hip abd  HEP 12/11   Bridge w/ marching  HEP 12/11   Bridge SL w/ abd band 2x10 HEP 12/11   Bridge with abd band 1x10               Steamboats, R stance HEP 12/11   Side stepping Green 2x20 stepsHEP 12/11   Fwd/bwd monsterwalks Green 2 laps    Pt edu 15 min Return to running program, use of SuperFeet arch supports, running gait pattern, stretching, HEP as maintenance routine   Manual Intervention (98365)     MFR: R ITB, R ant tib and peroneals    Foam roller: quads, ITB, calf, ant tib    Prox fib post glide gd3-4, mob with motion (DF)    Cupping lateral and anterior R lower leg              NMR re-education (43793)  CUES NEEDED                                                Therapeutic Activity (20618)                                         Therapeutic Exercise and NMR EXR  [x] (71577) Provided verbal/tactile cueing for activities related to strengthening, flexibility, endurance, ROM for improvements in LE, proximal hip, and core control with self care, mobility, lifting, ambulation.  [] (39686) Provided verbal/tactile cueing for activities related to improving balance, coordination, kinesthetic sense, posture, motor skill, proprioception  to assist with LE, proximal hip, and core control in self care, mobility, lifting, ambulation and eccentric single leg control.      NMR and Therapeutic Activities:    [] (54517 or 18050) Provided verbal/tactile cueing for activities related to improving balance, coordination, kinesthetic sense, posture, motor skill, proprioception and motor activation to allow for proper function of core, proximal hip and LE with self care and ADLs  [] (01383) Gait Re-education- Provided training and instruction to the patient for proper LE, core and proximal hip recruitment and positioning and eccentric body weight control with ambulation re-education including up and down stairs     Home Exercise Program:    [x] (85967) Reviewed/Progressed HEP activities related to strengthening, flexibility, endurance, ROM of core, proximal hip and LE for functional self-care, mobility, lifting and ambulation/stair navigation   [] (63675)Reviewed/Progressed HEP activities related to improving balance, coordination, kinesthetic sense, posture, motor skill, proprioception of core, proximal hip and LE for self care, mobility, lifting, and ambulation/stair navigation      Manual Treatments:  PROM / STM / Oscillations-Mobs:  G-I, II, III, IV (PA's, Inf., Post.)  [] (67787) Provided manual therapy to mobilize LE, proximal hip and/or LS spine soft tissue/joints for the purpose of modulating pain, promoting relaxation,  increasing ROM, reducing/eliminating soft tissue swelling/inflammation/restriction, improving soft tissue extensibility and allowing for proper ROM for normal function with self care, mobility, lifting and ambulation. Modalities:     [] GAME READY (VASO)- for significant edema, swelling, pain control. Access Code: [email or web address]   URL: [email or web address]   Date: 12/23/2020   Prepared by: Candy Taveras     Exercises   Bridge with Hip Abduction and Resistance - 10 reps - 3 sets - 1x daily - 7x weekly   Single Leg Bridge with Resistance Loop - 10 reps - 3 sets - 1x daily - 7x weekly   Clamshell with Resistance - 10 reps - 3 sets - 1x daily - 7x weekly   Sidelying Feet Elevated Clamshells - 10 reps - 3 sets - 1x daily - 7x weekly   Backward Band Walks with Resistance at Thighs and Ankles - 10 reps - 3 sets - 1x daily - 7x weekly   Side Stepping with Resistance at Ankles - 10 reps - 3 sets - 1x daily - 7x weekly   Forward Band Walks with Resistance at Thighs and Ankles - 10 reps - 3 sets - 1x daily - 7x weekly   Backward Band Walks with Resistance at Thighs and Feet - 10 reps - 3 sets - 1x daily - 7x weekly   Standing Hamstring Stretch on Chair - 1 reps - 3 sets - 30 sec hold - 1x daily - 7x weekly   ITB Stretch at Wall - 1 reps - 3 sets - 30 sec hold - 1x daily - 7x weekly       Charges:  Timed Code Treatment Minutes: 55 min   Total Treatment Minutes: 55 min       [] EVAL (LOW) 41809   [] EVAL (MOD) 43841  [] EVAL (HIGH) 75241   [] RE-EVAL     [x] AL(74033) x 4   [] IONTO  [] NMR (48951) x     [] VASO  [] Manual (85396) x    [] Other:  [] TA x      [] Mech Traction (08427)  [] ES(attended) (99189)      [] ES (un) (79542):       GOALS:  Patient stated goal: get back to sitting comfortably  [x] Progressing: [] Met: [] Not Met: [] Adjusted    Therapist goals for Patient:   Short Term Goals: To be achieved in: 2 weeks  1. Independent in HEP and progression per patient tolerance, in order to prevent re-injury. [] Progressing: [x] Met: [] Not Met: [] Adjusted  2. Patient will have a decrease in pain to facilitate improvement in movement, function, and ADLs as indicated by Functional Deficits. [] Progressing: [x] Met: [] Not Met: [] Adjusted    Long Term Goals: To be achieved in: 6 weeks  1. Disability index score of 10% or less for the LEFS to assist with reaching prior level of function. [] Progressing: [] Met: [] Not Met: [] Adjusted  2. Patient will demonstrate increased in LE flexibility by at least 25 deg with - flexibility special tests to allow for proper joint functioning as indicated by patients Functional Deficits. [] Progressing: [] Met: [] Not Met: [] Adjusted  3. Patient will demonstrate an increase in Strength to good proximal hip strength and control, within 5lb HHD in LE to allow for proper functional mobility as indicated by patients Functional Deficits. [] Progressing: [] Met: [] Not Met: [] Adjusted  4. Patient will be able to sit for at least 2 hours without increased symptoms or restriction. [x] Progressing: [] Met: [] Not Met: [] Adjusted  5. Pt will be able to run 2 miles without increased symptoms or restriction.    [x] Progressing: [] Met: [] Not Met: [] Adjusted     Progression Towards Functional goals:  [x] Patient is progressing as expected towards functional goals listed. [] Progression is slowed due to complexities listed. [] Progression has been slowed due to co-morbidities. [] Plan just implemented, too soon to assess goals progression  [] Other:         Overall Progression Towards Functional goals/ Treatment Progress Update:  [x] Patient is progressing as expected towards functional goals listed. [] Progression is slowed due to complexities/Impairments listed. [] Progression has been slowed due to co-morbidities. [] Plan just implemented, too soon to assess goals progression <30days   [] Goals require adjustment due to lack of progress  [] Patient is not progressing as expected and requires additional follow up with physician  [] Other    Prognosis for POC: [x] Good [] Fair  [] Poor      Patient requires continued skilled intervention: [x] Yes  [] No    Treatment/Activity Tolerance:  [x] Patient able to complete treatment  [] Patient limited by fatigue  [] Patient limited by pain    [] Patient limited by other medical complications  [] Other:     ASSESSMENT: Progressed pt's hip strengthening today as well as provided options for safe stretching in the upright position now that he is getting back into a running routine. Spent significant time today answering pt's questions and running routine, warm-up and stretching, considering arch supports, alternating the running route, etc.    Return to Play: (if applicable)   []  Stage 1: Intro to Strength   []  Stage 2: Return to Run and Strength   []  Stage 3: Return to Jump and Strength   []  Stage 4: Dynamic Strength and Agility   [x]  Stage 5: Sport Specific Training     []  Ready to Return to Play, Meets All Above Stages   []  Not Ready for Return to Sports   Comments:                               PLAN: See kenny. Next visit:return to cupping and manual work and consider D/C if running still going well. [x] Continue per plan of care [] Alter current plan (see comments above)   [] Plan of care initiated [] Hold pending MD visit [] Discharge      Electronically signed by:  Kiya Wiggins PT    Note: If patient does not return for scheduled/ recommended follow up visits, this note will serve as a discharge from care along with most recent update on progress.

## 2020-12-30 ENCOUNTER — HOSPITAL ENCOUNTER (OUTPATIENT)
Dept: PHYSICAL THERAPY | Age: 25
Setting detail: THERAPIES SERIES
Discharge: HOME | End: 2020-12-30
Payer: COMMERCIAL

## 2020-12-30 NOTE — FLOWSHEET NOTE
[facility] Orthopaedics and [address] [place] [address]; [facility]             Physical Therapy  Cancellation/No-show Note  Patient Name:  Nathan Boyd  :  1995   Date:  2020  Cancelled visits to date: 0  No-shows to date: 1    For today's appointment patient:  ?  Cancelled  ? Rescheduled appointment  X  No-show     Reason given by patient:  ?  Patient ill  ? Conflicting appointment  ? No transportation    ? Conflict with work  X  No reason given  ?   Other:     Comments:      Electronically signed by:  Rajani Lombardi PT

## 2021-01-06 ENCOUNTER — HOSPITAL ENCOUNTER (OUTPATIENT)
Dept: PHYSICAL THERAPY | Age: 26
Setting detail: THERAPIES SERIES
Discharge: HOME | End: 2021-01-06
Payer: COMMERCIAL

## 2021-01-06 NOTE — FLOWSHEET NOTE
[facility] Orthopaedics and [address] [place] [address]; [place]             Physical Therapy  Cancellation/No-show Note  Patient Name:  Pastor Nice  :  1995   Date:  2021  Cancelled visits to date: 0  No-shows to date: 2    For today's appointment patient:  ?  Cancelled  ? Rescheduled appointment  X  No-show     Reason given by patient:  ?  Patient ill  ? Conflicting appointment  ? No transportation    ? Conflict with work  X  No reason given  ?   Other:     Comments:      Electronically signed by:  Shakila Crowlye PT

## 2021-02-24 ENCOUNTER — TELEPHONE (OUTPATIENT)
Dept: PRIMARY CARE CLINIC | Age: 26
End: 2021-02-24

## 2021-02-24 DIAGNOSIS — S86.891A RIGHT MEDIAL TIBIAL STRESS SYNDROME, INITIAL ENCOUNTER: Primary | ICD-10-CM

## 2021-02-24 DIAGNOSIS — M76.31 ILIOTIBIAL BAND TENDINITIS OF RIGHT SIDE: ICD-10-CM

## 2021-02-24 RX ORDER — MELOXICAM 15 MG/1
15 TABLET ORAL DAILY
Qty: 30 TABLET | Refills: 5 | Status: SHIPPED | OUTPATIENT
Start: 2021-02-24 | End: 2022-09-12 | Stop reason: SDUPTHER

## 2021-02-24 NOTE — TELEPHONE ENCOUNTER
Pt has an appt with PT @ [facility] and they informed him that he needs a referral or the apt will have to be canceled.
Referral where?
Implemented All Fall Risk Interventions:  [place] to call system. Call bell, personal items and telephone within reach. Instruct patient to call for assistance. Room bathroom lighting operational. Non-slip footwear when patient is off stretcher. Physically safe environment: no spills, clutter or unnecessary equipment. Stretcher in lowest position, wheels locked, appropriate side rails in place. Provide visual cue, wrist band, yellow gown, etc. Monitor gait and stability. Monitor for mental status changes and reorient to person, place, and time. Review medications for side effects contributing to fall risk. Reinforce activity limits and safety measures with patient and family.

## 2021-03-01 ENCOUNTER — HOSPITAL ENCOUNTER (OUTPATIENT)
Dept: PHYSICAL THERAPY | Age: 26
Setting detail: THERAPIES SERIES
Discharge: HOME | End: 2021-03-01
Payer: COMMERCIAL

## 2021-03-01 PROCEDURE — 97164 PT RE-EVAL EST PLAN CARE: CPT | Performed by: PHYSICAL THERAPIST

## 2021-03-01 PROCEDURE — 97140 MANUAL THERAPY 1/> REGIONS: CPT | Performed by: PHYSICAL THERAPIST

## 2021-03-01 PROCEDURE — 97110 THERAPEUTIC EXERCISES: CPT | Performed by: PHYSICAL THERAPIST

## 2021-03-01 NOTE — FLOWSHEET NOTE
[facility] Orthopaedics and [address] [place] [address]; [facility]              Physical Therapy Re-Certification Plan of Care/MD UPDATE        Dear Dr Gopi Martin,    We had the pleasure of treating the following patient for physical therapy services at [address] [place] [address]. A summary of our findings can be found in the updated assessment below. This includes our plan of care. If you have any questions or concerns regarding these findings, please do not hesitate to contact me at the office phone number checked above.   Thank you for the referral.     Physician Signature:________________________________Date:__________________  By signing above (or electronic signature), therapists plan is approved by physician    Date Range Of Visits: 20-3/1/21  (patient did not attend PT between 20-3/1/20)  Total Visits to Date: 6  Overall Response to Treatment:   [x]Patient is responding well to treatment and improvement is noted with regards  to goals   []Patient should continue to improve in reasonable time if they continue HEP   []Patient has plateaued and is no longer responding to skilled PT intervention    []Patient is getting worse and would benefit from return to referring MD   []Patient unable to adhere to initial POC   []Other:   Plan:  Continue 1x week/4 weeks      Physical Therapy Treatment Note/ Progress Report:         Date:  3/1/2021    Patient Name:  Génesis Duenas    :  1995  MRN: [id number]  Restrictions/Precautions:    Medical/Treatment Diagnosis Information:  Diagnosis: M76.31 R ITB tendonitis  Treatment Diagnosis: M25.651 R hip stiffness, M76.31 R ITBS  Insurance/Certification information:  PT Insurance Information: [place], 40 visits, $0 copay, telehealth yes  Physician Information:  Referring Practitioner: Dr. Gopi Martin  Has the plan of care been signed (Y/N):        []  Yes  []  No     Date of Patient follow up with Physician: as R ant tib and peroneals 5 min   Foam roller: quads, ITB, calf, ant tib    Prox fib post glide gd3-4, mob with motion (DF)    Cupping lateral thigh and anterior R lower leg 10'             NMR re-education (97082)  CUES NEEDED                                                Therapeutic Activity (23203)                                         Therapeutic Exercise and NMR EXR  [x] (28031) Provided verbal/tactile cueing for activities related to strengthening, flexibility, endurance, ROM for improvements in LE, proximal hip, and core control with self care, mobility, lifting, ambulation.  [] (23113) Provided verbal/tactile cueing for activities related to improving balance, coordination, kinesthetic sense, posture, motor skill, proprioception  to assist with LE, proximal hip, and core control in self care, mobility, lifting, ambulation and eccentric single leg control.      NMR and Therapeutic Activities:    [] (19225 or 99253) Provided verbal/tactile cueing for activities related to improving balance, coordination, kinesthetic sense, posture, motor skill, proprioception and motor activation to allow for proper function of core, proximal hip and LE with self care and ADLs  [] (20173) Gait Re-education- Provided training and instruction to the patient for proper LE, core and proximal hip recruitment and positioning and eccentric body weight control with ambulation re-education including up and down stairs     Home Exercise Program:    [x] (17346) Reviewed/Progressed HEP activities related to strengthening, flexibility, endurance, ROM of core, proximal hip and LE for functional self-care, mobility, lifting and ambulation/stair navigation   [] (37455)Reviewed/Progressed HEP activities related to improving balance, coordination, kinesthetic sense, posture, motor skill, proprioception of core, proximal hip and LE for self care, mobility, lifting, and ambulation/stair navigation      Manual Treatments:  PROM / STM / Oscillations-Mobs:  G-I, II, III, IV (PA's, Inf., Post.)  [] (27910) Provided manual therapy to mobilize LE, proximal hip and/or LS spine soft tissue/joints for the purpose of modulating pain, promoting relaxation,  increasing ROM, reducing/eliminating soft tissue swelling/inflammation/restriction, improving soft tissue extensibility and allowing for proper ROM for normal function with self care, mobility, lifting and ambulation. Modalities:     [] GAME READY (VASO)- for significant edema, swelling, pain control. Access Code: [email or web address]   URL: [email or web address]   Date: 12/23/2020   Prepared by: Andreina Wyman     Exercises   Bridge with Hip Abduction and Resistance - 10 reps - 3 sets - 1x daily - 7x weekly   Single Leg Bridge with Resistance Loop - 10 reps - 3 sets - 1x daily - 7x weekly   Clamshell with Resistance - 10 reps - 3 sets - 1x daily - 7x weekly   Sidelying Feet Elevated Clamshells - 10 reps - 3 sets - 1x daily - 7x weekly   Backward Band Walks with Resistance at Thighs and Ankles - 10 reps - 3 sets - 1x daily - 7x weekly   Side Stepping with Resistance at Ankles - 10 reps - 3 sets - 1x daily - 7x weekly   Forward Band Walks with Resistance at Thighs and Ankles - 10 reps - 3 sets - 1x daily - 7x weekly   Backward Band Walks with Resistance at Thighs and Feet - 10 reps - 3 sets - 1x daily - 7x weekly   Standing Hamstring Stretch on Chair - 1 reps - 3 sets - 30 sec hold - 1x daily - 7x weekly   ITB Stretch at Wall - 1 reps - 3 sets - 30 sec hold - 1x daily - 7x weekly     Access Code: [email or web address]   URL: [email or web address]   Date: 03/01/2021   Prepared by: Destin Hyde     Exercises   Half Kneeling Hip Flexor Stretch - 3 reps - 30 seconds hold - 1x daily - 7x weekly   Standing Hip ER Stretch at Table - 3 reps - 30 seconds hold - 1x daily - 7x weekly     Charges:  Timed Code Treatment Minutes: 30 min   Total Treatment Minutes: 50 min       [] EVAL (LOW) 04275   [] EVAL (MOD) 86997   []

## 2021-03-15 ENCOUNTER — HOSPITAL ENCOUNTER (OUTPATIENT)
Dept: PHYSICAL THERAPY | Age: 26
Setting detail: THERAPIES SERIES
Discharge: HOME | End: 2021-03-15
Payer: COMMERCIAL

## 2021-03-15 PROCEDURE — 97140 MANUAL THERAPY 1/> REGIONS: CPT | Performed by: PHYSICAL THERAPIST

## 2021-03-15 PROCEDURE — 97110 THERAPEUTIC EXERCISES: CPT | Performed by: PHYSICAL THERAPIST

## 2021-03-15 NOTE — FLOWSHEET NOTE
[facility] Orthopaedics and Sports Rehabilitation; [place]              Physical Therapy Treatment Note/ Progress Report:         Date:  3/15/2021    Patient Name:  Janice Kolb    :  1995  MRN: [id number]  Restrictions/Precautions:    Medical/Treatment Diagnosis Information:  Diagnosis: M76.31 R ITB tendonitis  Treatment Diagnosis: M25.651 R hip stiffness, M76.31 R ITBS  Insurance/Certification information:  PT Insurance Information: [place], 40 visits, $0 copay, telehealth yes  Physician Information:  Referring Practitioner: Dr. Liborio Floyd  Has the plan of care been signed (Y/N):        []  Yes  []  No     Date of Patient follow up with Physician: as needed      Is this a Progress Report:     [x]  Yes  []  No        If Yes:  Date Range for reporting period:  Beginning 20  Ending 3/1/21    Progress report will be due (10 Rx or 30 days whichever is less): 52       Recertification will be due (POC Duration  / 90 days whichever is less): 21      Visit # Insurance Allowable Auth Required   In-person 7 40 visits []  Yes [x]  No    Telehealth - yes []  Yes [x]  No    Total 7         Functional Scale: LEFS: 72/80 = 10% disability   Date assessed:  3/1/21       Number of Comorbidities:  []0     []1-2    []3+    Latex Allergy:  [x]NO      []YES  Preferred Language for Healthcare:   [x]English       []other:      Pain level:  0/10     SUBJECTIVE:  States the leg has been feeling good over the past couple of weeks.      OBJECTIVE:    Observation:    Test measurements:       RESTRICTIONS/PRECAUTIONS: none    Exercises/Interventions:     Therapeutic Ex (10255) Sets/Reps/ sec Notes/CUES   Suipne HS stretch  Supine ITB stretch Fig 4 stretch HS stretch foot on stair Standing ITB wall stretch Long sit gastroc stretch strap Hip flexor stretch, standing Quad stretch, standing Clamshell feet up w/ band Green 30xReverse Clamshell w/ band Green 30x    S/L hip abd Bridge w/ URL: [email or web address]   Date: 03/01/2021   Prepared by: Latasha Course     Exercises   Half Kneeling Hip Flexor Stretch - 3 reps - 30 seconds hold - 1x daily - 7x weekly   Standing Hip ER Stretch at Table - 3 reps - 30 seconds hold - 1x daily - 7x weekly     Charges:  Timed Code Treatment Minutes: 30 min   Total Treatment Minutes: 50 min       [] EVAL (LOW) 53241   [] EVAL (MOD) 47012   [] EVAL (HIGH) 57901   [] RE-EVAL     [x] DS(49141) x 2   [] IONTO  [] NMR (09898) x     [] VASO  [x] Manual (11497) x  1  [] Other:  [] TA x      [] Mech Traction (72374)  [] ES(attended) (71549)      [] ES (un) (01798):       GOALS:  Patient stated goal: get back to sitting comfortably  [x] Progressing: [] Met: [] Not Met: [] Adjusted    Therapist goals for Patient:   Short Term Goals: To be achieved in: 2 weeks  1. Independent in HEP and progression per patient tolerance, in order to prevent re-injury. [] Progressing: [x] Met: [] Not Met: [] Adjusted  2. Patient will have a decrease in pain to facilitate improvement in movement, function, and ADLs as indicated by Functional Deficits. [] Progressing: [x] Met: [] Not Met: [] Adjusted    Long Term Goals: To be achieved in: 6 weeks  1. Disability index score of 10% or less for the LEFS to assist with reaching prior level of function. [] Progressing: [] Met: [] Not Met: [] Adjusted  2. Patient will demonstrate increased in LE flexibility by at least 25 deg with - flexibility special tests to allow for proper joint functioning as indicated by patients Functional Deficits. [] Progressing: [] Met: [] Not Met: [] Adjusted  3. Patient will demonstrate an increase in Strength to good proximal hip strength and control, within 5lb HHD in LE to allow for proper functional mobility as indicated by patients Functional Deficits. [] Progressing: [] Met: [] Not Met: [] Adjusted  4. Patient will be able to sit for at least 2 hours without increased symptoms or restriction.    [x] Progressing: [] Met: [] Not Met: [] Adjusted  5. Pt will be able to run 2 miles without increased symptoms or restriction. [x] Progressing: [] Met: [] Not Met: [] Adjusted     Progression Towards Functional goals:  [x] Patient is progressing as expected towards functional goals listed. [] Progression is slowed due to complexities listed. [] Progression has been slowed due to co-morbidities. [] Plan just implemented, too soon to assess goals progression  [] Other:         Overall Progression Towards Functional goals/ Treatment Progress Update:  [x] Patient is progressing as expected towards functional goals listed. [] Progression is slowed due to complexities/Impairments listed. [] Progression has been slowed due to co-morbidities. [] Plan just implemented, too soon to assess goals progression <30days   [] Goals require adjustment due to lack of progress  [] Patient is not progressing as expected and requires additional follow up with physician  [] Other    Prognosis for POC: [x] Good [] Fair  [] Poor      Patient requires continued skilled intervention: [x] Yes  [] No    Treatment/Activity Tolerance:  [x] Patient able to complete treatment  [] Patient limited by fatigue  [] Patient limited by pain    [] Patient limited by other medical complications  [] Other:     ASSESSMENT: Tolerated Rx well. Fatigued easily with progression of bridging ex's, cueing to maintain proper form.      Return to Play: (if applicable)   []  Stage 1: Intro to Strength   []  Stage 2: Return to Run and Strength   []  Stage 3: Return to Jump and Strength   []  Stage 4: Dynamic Strength and Agility   [x]  Stage 5: Sport Specific Training     []  Ready to Return to Play, Meets All Above Stages   []  Not Ready for Return to Sports   Comments:                               PLAN: Continue 1x wk/4 wks  [x] Continue per plan of care [] Alter current plan (see comments above)   [] Plan of care initiated [] Hold pending MD visit []

## 2021-03-24 ENCOUNTER — HOSPITAL ENCOUNTER (OUTPATIENT)
Dept: PHYSICAL THERAPY | Age: 26
Setting detail: THERAPIES SERIES
Discharge: HOME | End: 2021-03-24
Payer: COMMERCIAL

## 2021-03-24 PROCEDURE — 97110 THERAPEUTIC EXERCISES: CPT | Performed by: PHYSICAL THERAPIST

## 2021-03-24 PROCEDURE — 97140 MANUAL THERAPY 1/> REGIONS: CPT | Performed by: PHYSICAL THERAPIST

## 2021-03-24 NOTE — FLOWSHEET NOTE
30xReverse Clamshell w/ band Green 30x    S/L hip abd Bridge w/ marching Bridge SL w/ abd band Bridge: single leg eccentric 10x BSupine bridge with leg ext 10x5'' B   Kneeling hip flexor stretch 3x30'' B   Standing ITB stretch: leg on table 3x30'' B   SLB: cone  [id number] foam   elliptical 5'       Steamboats, R stance HEP 12/11   Side stepping 5 min Reviewed previous HEP for accuracy   Manual Intervention (40966)     MFR: R ITB, R ant tib and peroneals 5 minRoller stick   Foam roller: quads, ITB, calf, ant tib 5 min   Prox fib post glide gd3-4, mob with motion (DF)    Cupping lateral thigh and anterior R lower leg              NMR re-education (16813)  CUES NEEDED   Sciatic nerve glide: seated slump x15                                            Therapeutic Activity (68466)                                         Therapeutic Exercise and NMR EXR  [x] (22120) Provided verbal/tactile cueing for activities related to strengthening, flexibility, endurance, ROM for improvements in LE, proximal hip, and core control with self care, mobility, lifting, ambulation.  [] (44969) Provided verbal/tactile cueing for activities related to improving balance, coordination, kinesthetic sense, posture, motor skill, proprioception  to assist with LE, proximal hip, and core control in self care, mobility, lifting, ambulation and eccentric single leg control.      NMR and Therapeutic Activities:    [] (35636 or 76066) Provided verbal/tactile cueing for activities related to improving balance, coordination, kinesthetic sense, posture, motor skill, proprioception and motor activation to allow for proper function of core, proximal hip and LE with self care and ADLs  [] (82531) Gait Re-education- Provided training and instruction to the patient for proper LE, core and proximal hip recruitment and positioning and eccentric body weight control with ambulation re-education including up and down stairs     Home Exercise Program:    [x] (88082) Reviewed/Progressed HEP activities related to strengthening, flexibility, endurance, ROM of core, proximal hip and LE for functional self-care, mobility, lifting and ambulation/stair navigation   [] (70368)Reviewed/Progressed HEP activities related to improving balance, coordination, kinesthetic sense, posture, motor skill, proprioception of core, proximal hip and LE for self care, mobility, lifting, and ambulation/stair navigation      Manual Treatments:  PROM / STM / Oscillations-Mobs:  G-I, II, III, IV (PA's, Inf., Post.)  [x] (33400) Provided manual therapy to mobilize LE, proximal hip and/or LS spine soft tissue/joints for the purpose of modulating pain, promoting relaxation,  increasing ROM, reducing/eliminating soft tissue swelling/inflammation/restriction, improving soft tissue extensibility and allowing for proper ROM for normal function with self care, mobility, lifting and ambulation. Modalities:     [] GAME READY (VASO)- for significant edema, swelling, pain control. Access Code: [email or web address]   URL: [email or web address]   Date: 12/23/2020   Prepared by: Zbigniew Franks     Exercises   Bridge with Hip Abduction and Resistance - 10 reps - 3 sets - 1x daily - 7x weekly   Single Leg Bridge with Resistance Loop - 10 reps - 3 sets - 1x daily - 7x weekly   Clamshell with Resistance - 10 reps - 3 sets - 1x daily - 7x weekly   Sidelying Feet Elevated Clamshells - 10 reps - 3 sets - 1x daily - 7x weekly   Backward Band Walks with Resistance at Thighs and Ankles - 10 reps - 3 sets - 1x daily - 7x weekly   Side Stepping with Resistance at Ankles - 10 reps - 3 sets - 1x daily - 7x weekly   Forward Band Walks with Resistance at Thighs and Ankles - 10 reps - 3 sets - 1x daily - 7x weekly   Backward Band Walks with Resistance at Thighs and Feet - 10 reps - 3 sets - 1x daily - 7x weekly   Standing Hamstring Stretch on Chair - 1 reps - 3 sets - 30 sec hold - 1x daily - 7x weekly   ITB Stretch at Wall - 1 reps - 3 sets - 30 sec hold - 1x daily - 7x weekly     Access Code: [email or web address]   URL: [email or web address]   Date: 03/01/2021   Prepared by: Destin Hyde     Exercises   Half Kneeling Hip Flexor Stretch - 3 reps - 30 seconds hold - 1x daily - 7x weekly   Standing Hip ER Stretch at Table - 3 reps - 30 seconds hold - 1x daily - 7x weekly     Charges:  Timed Code Treatment Minutes: 45 min   Total Treatment Minutes: 50 min       [] EVAL (LOW) 33827   [] EVAL (MOD) 49229   [] EVAL (HIGH) 61659   [] RE-EVAL     [x] CJ(30053) x 2   [] IONTO  [] NMR (08511) x     [] VASO  [x] Manual (71797) x  1  [] Other:  [] TA x      [] Mech Traction (84938)  [] ES(attended) (19126)      [] ES (un) (85454):       GOALS:  Patient stated goal: get back to sitting comfortably  [x] Progressing: [] Met: [] Not Met: [] Adjusted    Therapist goals for Patient:   Short Term Goals: To be achieved in: 2 weeks  1. Independent in HEP and progression per patient tolerance, in order to prevent re-injury. [] Progressing: [x] Met: [] Not Met: [] Adjusted  2. Patient will have a decrease in pain to facilitate improvement in movement, function, and ADLs as indicated by Functional Deficits. [] Progressing: [x] Met: [] Not Met: [] Adjusted    Long Term Goals: To be achieved in: 6 weeks  1. Disability index score of 10% or less for the LEFS to assist with reaching prior level of function. [] Progressing: [] Met: [] Not Met: [] Adjusted  2. Patient will demonstrate increased in LE flexibility by at least 25 deg with - flexibility special tests to allow for proper joint functioning as indicated by patients Functional Deficits. [] Progressing: [] Met: [] Not Met: [] Adjusted  3. Patient will demonstrate an increase in Strength to good proximal hip strength and control, within 5lb HHD in LE to allow for proper functional mobility as indicated by patients Functional Deficits. [] Progressing: [] Met: [] Not Met: [] Adjusted  4.  Patient will be able to sit for at least 2 hours without increased symptoms or restriction. [x] Progressing: [] Met: [] Not Met: [] Adjusted  5. Pt will be able to run 2 miles without increased symptoms or restriction. [x] Progressing: [] Met: [] Not Met: [] Adjusted     Progression Towards Functional goals:  [x] Patient is progressing as expected towards functional goals listed. [] Progression is slowed due to complexities listed. [] Progression has been slowed due to co-morbidities. [] Plan just implemented, too soon to assess goals progression  [] Other:         Overall Progression Towards Functional goals/ Treatment Progress Update:  [x] Patient is progressing as expected towards functional goals listed. [] Progression is slowed due to complexities/Impairments listed. [] Progression has been slowed due to co-morbidities. [] Plan just implemented, too soon to assess goals progression <30days   [] Goals require adjustment due to lack of progress  [] Patient is not progressing as expected and requires additional follow up with physician  [] Other    Prognosis for POC: [x] Good [] Fair  [] Poor      Patient requires continued skilled intervention: [x] Yes  [] No    Treatment/Activity Tolerance:  [x] Patient able to complete treatment  [] Patient limited by fatigue  [] Patient limited by pain    [] Patient limited by other medical complications  [] Other:     ASSESSMENT: Pt tolerated treatment well. Added sciatic nerve gliding to HEP this date to address neural tension, and radiating symptoms complaint. Pt with no complaints with new interventions. Continues to be limited by soft tissue flexibility and pain/guarding.       Return to Play: (if applicable)   []  Stage 1: Intro to Strength   []  Stage 2: Return to Run and Strength   []  Stage 3: Return to Jump and Strength   []  Stage 4: Dynamic Strength and Agility   [x]  Stage 5: Sport Specific Training     []  Ready to Return to Play, Meets All Above Stages   []  Not Ready for Return to Sports   Comments:                               PLAN: Continue 1x wk/4 wks  [x] Continue per plan of care [] Alter current plan (see comments above)   [] Plan of care initiated [] Hold pending MD visit [] Discharge      Electronically signed by: Lazaro Blankenship, SPT, Estefani Hernandez PT, OMT-C      Note: If patient does not return for scheduled/ recommended follow up visits, this note will serve as a discharge from care along with most recent update on progress.

## 2021-03-29 ENCOUNTER — HOSPITAL ENCOUNTER (OUTPATIENT)
Dept: PHYSICAL THERAPY | Age: 26
Setting detail: THERAPIES SERIES
Discharge: HOME | End: 2021-03-29
Payer: COMMERCIAL

## 2021-03-29 PROCEDURE — 97110 THERAPEUTIC EXERCISES: CPT | Performed by: SPECIALIST/TECHNOLOGIST

## 2021-03-29 PROCEDURE — 97140 MANUAL THERAPY 1/> REGIONS: CPT | Performed by: SPECIALIST/TECHNOLOGIST

## 2021-03-29 NOTE — FLOWSHEET NOTE
[facility] Orthopaedics and Sports Rehabilitation; [facility]              Physical Therapy Treatment Note/ Progress Report:         Date:  3/29/2021    Patient Name:  Krystin Rodríguez    :  1995  MRN: [id number]  Restrictions/Precautions:    Medical/Treatment Diagnosis Information:  Diagnosis: M76.31 R ITB tendonitis  Treatment Diagnosis: M25.651 R hip stiffness, M76.31 R ITBS  Insurance/Certification information:  PT Insurance Information: [place], 40 visits, $0 copay, telehealth yes  Physician Information:  Referring Practitioner: Dr. Bruce Sos  Has the plan of care been signed (Y/N):        []  Yes  []  No     Date of Patient follow up with Physician: as needed      Is this a Progress Report:     [x]  Yes  []  No        If Yes:  Date Range for reporting period:  Beginning 20  Ending 3/1/21    Progress report will be due (10 Rx or 30 days whichever is less):        Recertification will be due (POC Duration  / 90 days whichever is less): 21      Visit # Insurance Allowable Auth Required   In-person 9 40 visits []  Yes [x]  No    Telehealth - yes []  Yes [x]  No    Total 9         Functional Scale: LEFS: 72/80 = 10% disability   Date assessed:  3/1/21       Number of Comorbidities:  []0     []1-2    []3+    Latex Allergy:  [x]NO      []YES  Preferred Language for Healthcare:   [x]English       []other:      Pain level:  nr/10     SUBJECTIVE:  Pt reports he is having good days and bad day with his RLE. Pt. Also c/o right glut pain with increase activities.       OBJECTIVE:    Observation:    Test measurements:      Pt with positive slump test 3/24/21     RESTRICTIONS/PRECAUTIONS: none    Exercises/Interventions:     Therapeutic Ex (62728) Sets/Reps/ sec Notes/CUES   Suipne HS stretch  Supine ITB stretch Fig 4 stretch HS stretch foot on stair Standing ITB wall stretch Long sit gastroc stretch strap Hip flexor stretch, standing Piriformis crossover stretch 2 x 30\" B     Clamshell feet up w/ band Green 30xReverse Clamshell w/ band Green 30x    S/L hip abd Bridge w/ marching Bridge SL w/ abd band Bridge: single leg eccentric 10x BSupine bridge with leg ext 10x5'' B   Kneeling hip flexor stretch 3x30'' B   Standing ITB stretch: leg on table 3x30'' B   SLB: cone  [id number] foam   Tabletop fig 4 hip stretch 3 x 30\"   elliptical 5'       Steamboats, R stance HEP 12/11   Side stepping 5 min Reviewed previous HEP for accuracy   Manual Intervention (56060)     MFR: R ITB, R ant tib and peroneals 5 minRoller stick   Thera gun right posterior hip 4' min    Foam roller: quads, ITB, calf, ant tib 5 min   Prox fib post glide gd3-4, mob with motion (DF)    Cupping lateral thigh and anterior R lower leg    ITB stretch 3 x 30\"         NMR re-education (71302)  CUES NEEDED   Sciatic nerve glide: seated slump x15                                            Therapeutic Activity (45846)                                         Therapeutic Exercise and NMR EXR  [x] (20524) Provided verbal/tactile cueing for activities related to strengthening, flexibility, endurance, ROM for improvements in LE, proximal hip, and core control with self care, mobility, lifting, ambulation.  [] (33791) Provided verbal/tactile cueing for activities related to improving balance, coordination, kinesthetic sense, posture, motor skill, proprioception  to assist with LE, proximal hip, and core control in self care, mobility, lifting, ambulation and eccentric single leg control.      NMR and Therapeutic Activities:    [] (67673 or 76676) Provided verbal/tactile cueing for activities related to improving balance, coordination, kinesthetic sense, posture, motor skill, proprioception and motor activation to allow for proper function of core, proximal hip and LE with self care and ADLs  [] (64637) Gait Re-education- Provided training and instruction to the patient for proper LE, core and proximal hip recruitment and positioning and eccentric body weight control with ambulation re-education including up and down stairs     Home Exercise Program:    [x] (86650) Reviewed/Progressed HEP activities related to strengthening, flexibility, endurance, ROM of core, proximal hip and LE for functional self-care, mobility, lifting and ambulation/stair navigation   [] (12043)Reviewed/Progressed HEP activities related to improving balance, coordination, kinesthetic sense, posture, motor skill, proprioception of core, proximal hip and LE for self care, mobility, lifting, and ambulation/stair navigation      Manual Treatments:  PROM / STM / Oscillations-Mobs:  G-I, II, III, IV (PA's, Inf., Post.)  [x] (54823) Provided manual therapy to mobilize LE, proximal hip and/or LS spine soft tissue/joints for the purpose of modulating pain, promoting relaxation,  increasing ROM, reducing/eliminating soft tissue swelling/inflammation/restriction, improving soft tissue extensibility and allowing for proper ROM for normal function with self care, mobility, lifting and ambulation. Modalities:     [] GAME READY (VASO)- for significant edema, swelling, pain control. Access Code: [email or web address]   URL: [email or web address]   Date: 12/23/2020   Prepared by: Zbigniew Franks     Exercises   Bridge with Hip Abduction and Resistance - 10 reps - 3 sets - 1x daily - 7x weekly   Single Leg Bridge with Resistance Loop - 10 reps - 3 sets - 1x daily - 7x weekly   Clamshell with Resistance - 10 reps - 3 sets - 1x daily - 7x weekly   Sidelying Feet Elevated Clamshells - 10 reps - 3 sets - 1x daily - 7x weekly   Backward Band Walks with Resistance at Thighs and Ankles - 10 reps - 3 sets - 1x daily - 7x weekly   Side Stepping with Resistance at Ankles - 10 reps - 3 sets - 1x daily - 7x weekly   Forward Band Walks with Resistance at Thighs and Ankles - 10 reps - 3 sets - 1x daily - 7x weekly   Backward Band Walks with Resistance at Thighs and Feet - 10 reps - 3 sets - 1x daily - 7x weekly   Standing Hamstring Stretch on Chair - 1 reps - 3 sets - 30 sec hold - 1x daily - 7x weekly   ITB Stretch at Wall - 1 reps - 3 sets - 30 sec hold - 1x daily - 7x weekly     Access Code: [email or web address]   URL: [email or web address]   Date: 03/01/2021   Prepared by: Sarah Kyle     Exercises   Half Kneeling Hip Flexor Stretch - 3 reps - 30 seconds hold - 1x daily - 7x weekly   Standing Hip ER Stretch at Table - 3 reps - 30 seconds hold - 1x daily - 7x weekly     Charges:  Timed Code Treatment Minutes: 45 min   Total Treatment Minutes: 50 min       [] EVAL (LOW) 77799   [] EVAL (MOD) 60276   [] EVAL (HIGH) 99414   [] RE-EVAL     [x] QW(76416) x 2   [] IONTO  [] NMR (42756) x     [] VASO  [x] Manual (49222) x  1  [] Other:  [] TA x      [] Mech Traction (78472)  [] ES(attended) (48238)      [] ES (un) (57191):       GOALS:  Patient stated goal: get back to sitting comfortably  [x] Progressing: [] Met: [] Not Met: [] Adjusted    Therapist goals for Patient:   Short Term Goals: To be achieved in: 2 weeks  1. Independent in HEP and progression per patient tolerance, in order to prevent re-injury. [] Progressing: [x] Met: [] Not Met: [] Adjusted  2. Patient will have a decrease in pain to facilitate improvement in movement, function, and ADLs as indicated by Functional Deficits. [] Progressing: [x] Met: [] Not Met: [] Adjusted    Long Term Goals: To be achieved in: 6 weeks  1. Disability index score of 10% or less for the LEFS to assist with reaching prior level of function. [] Progressing: [] Met: [] Not Met: [] Adjusted  2. Patient will demonstrate increased in LE flexibility by at least 25 deg with - flexibility special tests to allow for proper joint functioning as indicated by patients Functional Deficits. [] Progressing: [] Met: [] Not Met: [] Adjusted  3.  Patient will demonstrate an increase in Strength to good proximal hip strength and control, within 5lb HHD in LE to allow for proper functional mobility as indicated by patients Functional Deficits. [] Progressing: [] Met: [] Not Met: [] Adjusted  4. Patient will be able to sit for at least 2 hours without increased symptoms or restriction. [x] Progressing: [] Met: [] Not Met: [] Adjusted  5. Pt will be able to run 2 miles without increased symptoms or restriction. [x] Progressing: [] Met: [] Not Met: [] Adjusted     Progression Towards Functional goals:  [x] Patient is progressing as expected towards functional goals listed. [] Progression is slowed due to complexities listed. [] Progression has been slowed due to co-morbidities. [] Plan just implemented, too soon to assess goals progression  [] Other:         Overall Progression Towards Functional goals/ Treatment Progress Update:  [x] Patient is progressing as expected towards functional goals listed. [] Progression is slowed due to complexities/Impairments listed. [] Progression has been slowed due to co-morbidities. [] Plan just implemented, too soon to assess goals progression <30days   [] Goals require adjustment due to lack of progress  [] Patient is not progressing as expected and requires additional follow up with physician  [] Other    Prognosis for POC: [x] Good [] Fair  [] Poor      Patient requires continued skilled intervention: [x] Yes  [] No    Treatment/Activity Tolerance:  [x] Patient able to complete treatment  [] Patient limited by fatigue  [] Patient limited by pain    [] Patient limited by other medical complications  [] Other:     ASSESSMENT: Pt tolerated treatment well. TTP over right glut med / piriformis. Pt. Reports decrease hip muscle tension after manual therapy. Pt with no complaints with new interventions. Continues to be limited by soft tissue flexibility and pain/guarding.       Return to Play: (if applicable)   []  Stage 1: Intro to Strength   []  Stage 2: Return to Run and Strength   []  Stage 3: Return to Jump and Strength   []  Stage 4: Dynamic Strength and Agility   [x]  Stage 5: Sport Specific Training     []  Ready to Return to Play, Meets All Above Stages   []  Not Ready for Return to Sports   Comments:                               PLAN: Continue 1x wk/4 wks  [x] Continue per plan of care [] Alter current plan (see comments above)   [] Plan of care initiated [] Hold pending MD visit [] Discharge      Electronically signed by: Korina Pritchett, PTA  [id number], Valorie Sosa, PT, MPT       Note: If patient does not return for scheduled/ recommended follow up visits, this note will serve as a discharge from care along with most recent update on progress.

## 2021-04-05 ENCOUNTER — HOSPITAL ENCOUNTER (OUTPATIENT)
Dept: PHYSICAL THERAPY | Age: 26
Setting detail: THERAPIES SERIES
Discharge: HOME | End: 2021-04-05
Payer: COMMERCIAL

## 2021-04-05 PROCEDURE — 97110 THERAPEUTIC EXERCISES: CPT | Performed by: PHYSICAL THERAPIST

## 2021-04-05 PROCEDURE — 97140 MANUAL THERAPY 1/> REGIONS: CPT | Performed by: PHYSICAL THERAPIST

## 2021-04-05 NOTE — PLAN OF CARE
[facility] Orthopaedics and [address] [place] [address]; [facility]                Physical Therapy Re-Certification Plan of Care/MD UPDATE        Dear Dr [place] Cunningham,     We had the pleasure of treating the following patient for physical therapy services at [address] [place] [address]. A summary of our findings can be found in the updated assessment below. This includes our plan of care. If you have any questions or concerns regarding these findings, please do not hesitate to contact me at the office phone number checked above.   Thank you for the referral.     Physician Signature:________________________________Date:__________________  By signing above (or electronic signature), therapists plan is approved by physician    Date Range Of Visits: 20-21  Total Visits to Date: 8  Overall Response to Treatment:   [x]Patient is responding well to treatment and improvement is noted with regards  to goals   []Patient should continue to improve in reasonable time if they continue HEP   []Patient has plateaued and is no longer responding to skilled PT intervention    []Patient is getting worse and would benefit from return to referring MD   []Patient unable to adhere to initial POC   []Other:   Plan:  Continue 1-2x wk/ 4 wks         Physical Therapy Treatment Note/ Progress Report:         Date:  2021    Patient Name:  Belvin Alpers    :  1995  MRN: [id number]  Restrictions/Precautions:    Medical/Treatment Diagnosis Information:  Diagnosis: M76.31 R ITB tendonitis  Treatment Diagnosis: M25.651 R hip stiffness, M76.31 R ITBS  Insurance/Certification information:  PT Insurance Information: Wendy, 40 visits, $0 copay, telehealth yes  Physician Information:  Referring Practitioner: Dr. [place] Cunningham  Has the plan of care been signed (Y/N):        []  Yes  []  No     Date of Patient follow up with Physician: as needed      Is this a Progress Report:     [x] Yes  []  No        If Yes:  Date Range for reporting period:  Beginning 11/27/20  Ending 4/5/21    Progress report will be due (10 Rx or 30 days whichever is less): 1/7/67       Recertification will be due (POC Duration  / 90 days whichever is less): 5/5/21      Visit # Insurance Allowable Auth Required   In-person 10 40 visits []  Yes [x]  No    Telehealth - yes []  Yes [x]  No    Total 10         Functional Scale: LEFS: 71/80 = 11% disability   Date assessed:  4/5/21       Number of Comorbidities:  []0     []1-2    []3+    Latex Allergy:  [x]NO      []YES  Preferred Language for Healthcare:   [x]English       []other:      Pain level:  2/10     SUBJECTIVE:  Reports feeling better overall but is not 100%. Notices increased discomfort with prolonged sitting for work. Still unable to run due to discomfort.        OBJECTIVE:    Observation:    Test measurements:      Pt with positive slump test 3/24/21   4/5/21: 90-90 SLR: R -20, L -5;  MMT: hip abd 4/5, hip ext 4+/5     RESTRICTIONS/PRECAUTIONS: none    Exercises/Interventions:     Therapeutic Ex (66072) Sets/Reps/ sec Notes/CUES   Suipne HS stretch  Supine ITB stretch Fig 4 stretch HS stretch foot on stair Standing ITB wall stretch Long sit gastroc stretch strap Hip flexor stretch, standing Piriformis crossover stretch 2 x 30\" B     Clamshell feet up w/ band Green 30xReverse Clamshell w/ band Green 30x    S/L hip abd Bridge w/ marching Bridge SL w/ abd band Bridge: single leg eccentric 10x BSupine bridge with leg ext 10x5'' B   Kneeling hip flexor stretch 3x30'' B   Standing ITB stretch: leg on table 3x30'' B   SLB: cone  [id number] foam   Tabletop fig 4 hip stretch 3 x 30\"   elliptical 5'   Donkey kick @ EOT 2x10 B   Steamboats, R stance HEP 12/11   Side stepping 5 min Reviewed previous HEP for accuracy   Manual Intervention (59454)     MFR: R ITB, R ant tib and peroneals 5 minRoller stick   Thera gun right posterior hip 4' min    Foam roller: quads, ITB, calf, ant tib 5 min   Prox fib post glide gd3-4, mob with motion (DF)    Cupping lateral thigh and anterior R lower leg    ITB stretch 3 x 30\"         NMR re-education (45823)  CUES NEEDED   Sciatic nerve glide: seated slump x15                                            Therapeutic Activity (08473)                                         Therapeutic Exercise and NMR EXR  [x] (78708) Provided verbal/tactile cueing for activities related to strengthening, flexibility, endurance, ROM for improvements in LE, proximal hip, and core control with self care, mobility, lifting, ambulation.  [] (23759) Provided verbal/tactile cueing for activities related to improving balance, coordination, kinesthetic sense, posture, motor skill, proprioception  to assist with LE, proximal hip, and core control in self care, mobility, lifting, ambulation and eccentric single leg control.      NMR and Therapeutic Activities:    [] (33819 or 93731) Provided verbal/tactile cueing for activities related to improving balance, coordination, kinesthetic sense, posture, motor skill, proprioception and motor activation to allow for proper function of core, proximal hip and LE with self care and ADLs  [] (71164) Gait Re-education- Provided training and instruction to the patient for proper LE, core and proximal hip recruitment and positioning and eccentric body weight control with ambulation re-education including up and down stairs     Home Exercise Program:    [x] (80348) Reviewed/Progressed HEP activities related to strengthening, flexibility, endurance, ROM of core, proximal hip and LE for functional self-care, mobility, lifting and ambulation/stair navigation   [] (92714)Reviewed/Progressed HEP activities related to improving balance, coordination, kinesthetic sense, posture, motor skill, proprioception of core, proximal hip and LE for self care, mobility, lifting, and ambulation/stair navigation      Manual Treatments:  PROM / STM / Oscillations-Mobs:  G-I, II, III, IV (PA's, Inf., Post.)  [x] (88239) Provided manual therapy to mobilize LE, proximal hip and/or LS spine soft tissue/joints for the purpose of modulating pain, promoting relaxation,  increasing ROM, reducing/eliminating soft tissue swelling/inflammation/restriction, improving soft tissue extensibility and allowing for proper ROM for normal function with self care, mobility, lifting and ambulation. Modalities:     [] GAME READY (VASO)- for significant edema, swelling, pain control. Access Code: [email or web address]   URL: [email or web address]   Date: 12/23/2020   Prepared by: Ashley Sole     Exercises   Bridge with Hip Abduction and Resistance - 10 reps - 3 sets - 1x daily - 7x weekly   Single Leg Bridge with Resistance Loop - 10 reps - 3 sets - 1x daily - 7x weekly   Clamshell with Resistance - 10 reps - 3 sets - 1x daily - 7x weekly   Sidelying Feet Elevated Clamshells - 10 reps - 3 sets - 1x daily - 7x weekly   Backward Band Walks with Resistance at Thighs and Ankles - 10 reps - 3 sets - 1x daily - 7x weekly   Side Stepping with Resistance at Ankles - 10 reps - 3 sets - 1x daily - 7x weekly   Forward Band Walks with Resistance at Thighs and Ankles - 10 reps - 3 sets - 1x daily - 7x weekly   Backward Band Walks with Resistance at Thighs and Feet - 10 reps - 3 sets - 1x daily - 7x weekly   Standing Hamstring Stretch on Chair - 1 reps - 3 sets - 30 sec hold - 1x daily - 7x weekly   ITB Stretch at Wall - 1 reps - 3 sets - 30 sec hold - 1x daily - 7x weekly     Access Code: [email or web address]   URL: [email or web address]   Date: 03/01/2021   Prepared by: Keenan Fowler     Exercises   Half Kneeling Hip Flexor Stretch - 3 reps - 30 seconds hold - 1x daily - 7x weekly   Standing Hip ER Stretch at Table - 3 reps - 30 seconds hold - 1x daily - 7x weekly     Charges:  Timed Code Treatment Minutes: 45 min   Total Treatment Minutes: 50 min       [] EVAL (LOW) 66505   [] EVAL (MOD) 95417 [] EVAL (HIGH) 09693   [] RE-EVAL     [x] LI(07677) x 2   [] IONTO  [] NMR (80890) x     [] VASO  [x] Manual (71896) x  1  [] Other:  [] TA x      [] Mech Traction (39129)  [] ES(attended) (40731)      [] ES (un) (48125):       GOALS:  Patient stated goal: get back to sitting comfortably  [x] Progressing: [] Met: [] Not Met: [] Adjusted    Therapist goals for Patient:   Short Term Goals: To be achieved in: 2 weeks  1. Independent in HEP and progression per patient tolerance, in order to prevent re-injury. [] Progressing: [x] Met: [] Not Met: [] Adjusted  2. Patient will have a decrease in pain to facilitate improvement in movement, function, and ADLs as indicated by Functional Deficits. [] Progressing: [x] Met: [] Not Met: [] Adjusted    Long Term Goals: To be achieved in: 6 weeks  1. Disability index score of 10% or less for the LEFS to assist with reaching prior level of function. [] Progressing: [] Met: [] Not Met: [] Adjusted  2. Patient will demonstrate increased in LE flexibility by at least 25 deg with - flexibility special tests to allow for proper joint functioning as indicated by patients Functional Deficits. [] Progressing: [] Met: [] Not Met: [] Adjusted  3. Patient will demonstrate an increase in Strength to good proximal hip strength and control, within 5lb HHD in LE to allow for proper functional mobility as indicated by patients Functional Deficits. [] Progressing: [] Met: [] Not Met: [] Adjusted  4. Patient will be able to sit for at least 2 hours without increased symptoms or restriction. [x] Progressing: [] Met: [] Not Met: [] Adjusted  5. Pt will be able to run 2 miles without increased symptoms or restriction. [x] Progressing: [] Met: [] Not Met: [] Adjusted     Progression Towards Functional goals:  [x] Patient is progressing as expected towards functional goals listed. [] Progression is slowed due to complexities listed.   [] Progression has been slowed due to co-morbidities. [] Plan just implemented, too soon to assess goals progression  [] Other:         Overall Progression Towards Functional goals/ Treatment Progress Update:  [x] Patient is progressing as expected towards functional goals listed. [] Progression is slowed due to complexities/Impairments listed. [] Progression has been slowed due to co-morbidities. [] Plan just implemented, too soon to assess goals progression <30days   [] Goals require adjustment due to lack of progress  [] Patient is not progressing as expected and requires additional follow up with physician  [] Other    Prognosis for POC: [x] Good [] Fair  [] Poor      Patient requires continued skilled intervention: [x] Yes  [] No    Treatment/Activity Tolerance:  [x] Patient able to complete treatment  [] Patient limited by fatigue  [] Patient limited by pain    [] Patient limited by other medical complications  [] Other:     ASSESSMENT:  Tightness persists throughout R hamstring/piriformis/ITB. Does have mild neural tension symptoms noted and (+)Slump test on R. Glut med is weak and fatigues easily with isolated repetitive activities. Return to Play: (if applicable)   []  Stage 1: Intro to Strength   []  Stage 2: Return to Run and Strength   []  Stage 3: Return to Jump and Strength   []  Stage 4: Dynamic Strength and Agility   [x]  Stage 5: Sport Specific Training     []  Ready to Return to Play, Meets All Above Stages   []  Not Ready for Return to Sports   Comments:                               PLAN: Continue 1x wk/4 wks  [x] Continue per plan of care [] Alter current plan (see comments above)   [] Plan of care initiated [] Hold pending MD visit [] Discharge      Electronically signed by: Augustin Santos PT, OMT-C         Note: If patient does not return for scheduled/ recommended follow up visits, this note will serve as a discharge from care along with most recent update on progress.

## 2021-06-16 ENCOUNTER — OFFICE VISIT (OUTPATIENT)
Dept: PRIMARY CARE CLINIC | Age: 26
End: 2021-06-16
Payer: COMMERCIAL

## 2021-06-16 ENCOUNTER — TELEPHONE (OUTPATIENT)
Dept: PRIMARY CARE CLINIC | Age: 26
End: 2021-06-16

## 2021-06-16 ENCOUNTER — HOSPITAL ENCOUNTER (OUTPATIENT)
Dept: GENERAL RADIOLOGY | Age: 26
Discharge: HOME | End: 2021-06-16
Payer: COMMERCIAL

## 2021-06-16 VITALS
WEIGHT: 172 LBS | HEIGHT: 71 IN | DIASTOLIC BLOOD PRESSURE: 67 MMHG | SYSTOLIC BLOOD PRESSURE: 117 MMHG | BODY MASS INDEX: 24.08 KG/M2 | HEART RATE: 71 BPM

## 2021-06-16 DIAGNOSIS — M79.18 PAIN IN RIGHT BUTTOCK: Primary | ICD-10-CM

## 2021-06-16 DIAGNOSIS — M79.18 PAIN IN RIGHT BUTTOCK: ICD-10-CM

## 2021-06-16 PROCEDURE — 99213 OFFICE O/P EST LOW 20 MIN: CPT | Performed by: STUDENT IN AN ORGANIZED HEALTH CARE EDUCATION/TRAINING PROGRAM

## 2021-06-16 PROCEDURE — 73502 X-RAY EXAM HIP UNI 2-3 VIEWS: CPT

## 2021-06-16 SDOH — ECONOMIC STABILITY: FOOD INSECURITY: WITHIN THE PAST 12 MONTHS, YOU WORRIED THAT YOUR FOOD WOULD RUN OUT BEFORE YOU GOT MONEY TO BUY MORE.: NEVER TRUE

## 2021-06-16 SDOH — ECONOMIC STABILITY: FOOD INSECURITY: WITHIN THE PAST 12 MONTHS, THE FOOD YOU BOUGHT JUST DIDN'T LAST AND YOU DIDN'T HAVE MONEY TO GET MORE.: NEVER TRUE

## 2021-06-16 ASSESSMENT — PATIENT HEALTH QUESTIONNAIRE - PHQ9
SUM OF ALL RESPONSES TO PHQ QUESTIONS 1-9: 0
1. LITTLE INTEREST OR PLEASURE IN DOING THINGS: 0
2. FEELING DOWN, DEPRESSED OR HOPELESS: 0
SUM OF ALL RESPONSES TO PHQ9 QUESTIONS 1 & 2: 0
SUM OF ALL RESPONSES TO PHQ QUESTIONS 1-9: 0
SUM OF ALL RESPONSES TO PHQ QUESTIONS 1-9: 0

## 2021-06-16 ASSESSMENT — SOCIAL DETERMINANTS OF HEALTH (SDOH): HOW HARD IS IT FOR YOU TO PAY FOR THE VERY BASICS LIKE FOOD, HOUSING, MEDICAL CARE, AND HEATING?: NOT HARD AT ALL

## 2021-06-16 NOTE — TELEPHONE ENCOUNTER
----- Message from Barbara Bingham DO sent at 6/16/2021 12:38 PM EDT -----  Negative Xray of sacrum and pelvis.

## 2021-06-16 NOTE — PROGRESS NOTES
2021     Ritchie Rivera (:  1995) is a 23 y.o. male, here for evaluation of the following medical concerns:    HPI  Right buttock pain: Deepthi Holley presents today for follow-up evaluation of left buttock pain. He was seen in December of last year. At that time he was complaining of some left lateral hip pain which traveled down into his knee. He has been doing physical therapy for what was thought to be an IT band strain. He has noted significant improvement in the symptoms; however, he continues to have this vague mid buttock pain on his right. Physical therapy thought it might have been his piriformis and they have been doing strengthening and stretching in that area; however, he continues to have persistent pain. He feels like the pain shoots down his right leg into his calf and foot. He has had no numbness or weakness. He has no history of low back pain. Review of Systems   Constitutional: Positive for activity change (not jogging due to pain). Musculoskeletal: Negative for arthralgias, gait problem and neck pain. Neurological: Negative for dizziness, weakness and numbness. Prior to Visit Medications    Medication Sig Taking? Authorizing Provider   meloxicam (MOBIC) 15 MG tablet Take 1 tablet by mouth daily Yes Pratima Rodríguez,         Social History     Tobacco Use    Smoking status: Never Smoker    Smokeless tobacco: Never Used   Substance Use Topics    Alcohol use: Yes     Comment: occ        Vitals:    21 1110   BP: 117/67   Site: Right Upper Arm   Position: Sitting   Cuff Size: Medium Adult   Pulse: 71   Weight: 172 lb (78 kg)   Height: 5' 10.5\" (1.791 m)     Estimated body mass index is 24.33 kg/m² as calculated from the following:    Height as of this encounter: 5' 10.5\" (1.791 m). Weight as of this encounter: 172 lb (78 kg). Physical Exam  Constitutional:       Appearance: Normal appearance. He is normal weight.    HENT:      Head: Normocephalic and atraumatic. Mouth/Throat:      Mouth: Mucous membranes are moist.      Pharynx: Oropharynx is clear. Eyes:      Extraocular Movements: Extraocular movements intact. Conjunctiva/sclera: Conjunctivae normal.   Musculoskeletal:         General: No swelling, tenderness, deformity or signs of injury. Normal range of motion. Skin:     General: Skin is warm and dry. Neurological:      General: No focal deficit present. Mental Status: He is alert. Psychiatric:         Mood and Affect: Mood normal.       ASSESSMENT/PLAN:  1. Pain in right buttock: Patient presents with persistent right mid buttock pain despite approximately 6 months of physical therapy. He has been avoiding activities likes to do such as jog or play intermural sports due to pain. Exam grossly normal today and I am not able to reproduce his pain. Given persistence of his pain I doubt a muscular issue or tendinitis as it would have likely improved with physical therapy and rest.  We will check an x-ray of the right hip joint and also the SI joint. Referring to Dr. Jessie Ritter for evaluation. - XR HIP RIGHT (2-3 VIEWS); Future  - XR SACRUM COCCYX (MIN 2 VIEWS); Future  - Caty Bui MD, Orthopedic Surgery (Primary Care Sports Medicine), [facility]      Return if symptoms worsen or fail to improve. An electronic signature was used to authenticate this note.     --Eva Pearl DO on 6/16/2021 at 12:27 PM

## 2021-06-16 NOTE — PATIENT INSTRUCTIONS
Patient Education        Groin Strain: Care Instructions  Your Care Instructions  A groin strain is an injury that happens when you tear or overstretch (pull) a groin muscle. The groin muscles are in the area on either side of the body in the folds where the belly joins the legs. You can strain a groin muscle during exercise, such as running, skating, kicking in soccer, or playing basketball. It can happen when you lift, push, or pull heavy objects. You might pull a groin muscle when you fall. The injury can range from a minor pull to a more serious tear of the muscle. You may feel pain and tenderness that's worse when you squeeze your legs together. You may also have pain when you raise the knee of the injured side. There may be swelling or bruising in the groin area or inner thigh. If you have a bad strain, you may walk with a limp while it heals. Rest and other home care can help the muscle heal. Healing can take up to 3 weeks or more. Your doctor may want to see you again in 2 to 3 weeks. Follow-up care is a key part of your treatment and safety. Be sure to make and go to all appointments, and call your doctor if you are having problems. It's also a good idea to know your test results and keep a list of the medicines you take. How can you care for yourself at home? · Be safe with medicines. Read and follow all instructions on the label. ? If the doctor gave you a prescription medicine for pain, take it as prescribed. ? If you are not taking a prescription pain medicine, ask your doctor if you can take an over-the-counter medicine. · Rest and protect your injured or sore groin area for 1 to 2 weeks. Stop, change, or take a break from any activity that may be causing your pain or soreness. Do not do intense activities while you still have pain. · Put ice or a cold pack on your groin area for 10 to 20 minutes at a time.  Try to do this every 1 to 2 hours for the next 3 days (when you are awake) or until the swelling goes down. Put a thin cloth between the ice and your skin. · After 2 or 3 days, if your swelling is gone, apply heat. Put a warm water bottle, a heating pad set on low, or a warm cloth on your groin area. Do not go to sleep with a heating pad on your skin. · If your doctor gave you crutches, make sure you use them as directed. · Wear snug shorts or underwear that support the injured area. When should you call for help? Call your doctor now or seek immediate medical care if:    · You have new or severe pain or swelling in the groin area.     · Your groin or upper thigh is cool or pale or changes color.     · You have tingling, weakness, or numbness in your groin or leg.     · You cannot move your leg.     · You cannot put weight on your leg. Watch closely for changes in your health, and be sure to contact your doctor if:    · You do not get better as expected. Where can you learn more? Go to https://[employer]. org and sign in to your [employer] account. Enter Q616 in the [employer] box to learn more about \"Groin Strain: Care Instructions. \"     If you do not have an account, please click on the \"Sign Up Now\" link. Current as of: November 16, 2020               Content Version: 12.9  © 1903-1880 [employer]. Care instructions adapted under license by [facility] ([facility]). If you have questions about a medical condition or this instruction, always ask your healthcare professional. [place] [address] any warranty or liability for your use of this information.

## 2021-06-30 ENCOUNTER — OFFICE VISIT (OUTPATIENT)
Dept: ORTHOPEDIC SURGERY | Age: 26
End: 2021-06-30
Payer: COMMERCIAL

## 2021-06-30 ENCOUNTER — TELEPHONE (OUTPATIENT)
Dept: ORTHOPEDIC SURGERY | Age: 26
End: 2021-06-30

## 2021-06-30 VITALS — WEIGHT: 178 LBS | BODY MASS INDEX: 25.48 KG/M2 | HEIGHT: 70 IN

## 2021-06-30 DIAGNOSIS — M79.18 MYOFASCIAL PAIN SYNDROME OF LUMBAR SPINE: ICD-10-CM

## 2021-06-30 DIAGNOSIS — M76.31 ILIOTIBIAL BAND SYNDROME OF RIGHT SIDE: ICD-10-CM

## 2021-06-30 DIAGNOSIS — M54.50 LUMBAR PAIN: Primary | ICD-10-CM

## 2021-06-30 DIAGNOSIS — M79.604 RIGHT LEG PAIN: ICD-10-CM

## 2021-06-30 PROCEDURE — 99243 OFF/OP CNSLTJ NEW/EST LOW 30: CPT | Performed by: FAMILY MEDICINE

## 2021-06-30 RX ORDER — METHYLPREDNISOLONE 4 MG/1
TABLET ORAL
Qty: 21 KIT | Refills: 0 | Status: SHIPPED | OUTPATIENT
Start: 2021-06-30 | End: 2021-07-14

## 2021-06-30 NOTE — TELEPHONE ENCOUNTER
Left voicemail for patient that their MRI has been authorized and that they can call and schedule scan at their convenience. Also told them that they can call and schedule a f/u with Dr. Pierre Campbell once they have MRI scheduled, leaving at least 2-3 days for our office to receive their results.

## 2021-06-30 NOTE — PROGRESS NOTES
Dull  Duration of Pain: A few days  Frequency of Pain: Intermittent  Aggravating Factors:  (SITTING AWKWARD POSITIONS, SITTING LONG PERIODS, WALKING LONG PERIODS)  Limiting Behavior: Some  Relieving Factors: Rest  Result of Injury: No  Work-Related Injury: No  Are there other pain locations you wish to document?: No       Medical History  Patient's medications, allergies, past medical, surgical, social and family histories were reviewed and updated as appropriate. Review of Systems  Pertinent items are noted in HPI  Review of systems reviewed from Patient History Form dated on 6/30/2021 and available in the patient's chart under the Media tab. Vital Signs  There were no vitals filed for this visit. General Exam:     Constitutional: Patient is adequately groomed with no evidence of malnutrition  DTRs: Deep tendon reflexes are intact  Mental Status: The patient is oriented to time, place and person. The patient's mood and affect are appropriate. Lymphatic: The lymphatic examination bilaterally reveals all areas to be without enlargement or induration. Vascular: Examination reveals no swelling or calf tenderness. Peripheral pulses are palpable and 2+. Neurological: The patient has good coordination. There is no weakness or sensory deficit. Lumbar/Sacral Spine Examination  Inspection: There is no high-grade deformity or soft tissue swelling. No palpable spasm. Palpation: Does have some tenderness along lumbar paraspinals as well as lower lumbar facets with more central gluteal and posterior lateral gluteal tenderness on the right. He has mild tenderness over the right greater trochanteric bursa and IT band. Rang of Motion: Reasonable lumbar motion and some pain to the lower back with side loading on the right producing some gluteal discomfort but this does not seem to radiate pain into his lower extremities currently.       Strength: Mild hip weakness 4 out of 5 with hip flexion and abduction. Hamstrings and IT band somewhat tight. Special Tests: He does have an equivocal straight leg raise on the right and negative on the left. Trace positive Tonya's test.  Negative logroll testing. Skin: There are no rashes, ulcerations or lesions. Distal motor sensory and vascular exams intact. Gait: Fluid smooth gait    Reflexes:  Symmetrically preserved     Additional Comments:     Additional Examinations:  Contralateral Exam: Examination of the left hip reveals intact skin. The patient demonstrates full painless range of motion with regards to flexion, abduction, internal and external rotation. There is not tenderness about the greater trochanter. There is a negative straight leg raise against resistance. Strength is 5/5 thorough out all planes. Right Lower Extremity: Examination of the right lower extremity does not show any tenderness, deformity or injury. Range of motion is unremarkable. There is no gross instability. There are no rashes, ulcerations or lesions. Strength and tone are normal.  Left Lower Extremity: Examination of the left lower extremity does not show any tenderness, deformity or injury. Range of motion is unremarkable. There is no gross instability. There are no rashes, ulcerations or lesions. Strength and tone are normal.      Diagnostic Test Findings: AP lateral and oblique films x2 lumbar spine were obtained today in the office does not show any evidence of acute osseous injury although he may have some mild lower facet arthropathy. His right hip AP and frog films and AP and lateral sacrum pelvis films were reviewed from Dr. Libby De La Rosa on 6/16/2021 does not show any evidence of high-grade osseous abnormality or degenerative changes although he does exhibit right-sided femoral anteversion.       Assessment: #1.  8 to 9-month status post persistent symptomatically worsening mechanical low back pain with lumbar myofascial pain with right hip IT band and leg pain (rule out occult right L5 radiculopathy)    Impression:  Encounter Diagnoses   Name Primary?  Lumbar pain Yes    Myofascial pain syndrome of lumbar spine     Iliotibial band syndrome of right side     Right leg pain        Office Procedures:  Orders Placed This Encounter   Procedures    XR LUMBAR SPINE (MIN 4 VIEWS)     Standing Status:   Future     Number of Occurrences:   1     Standing Expiration Date:   6/30/2022     Order Specific Question:   Reason for exam:     Answer:   pain    MRI LUMBAR SPINE WO CONTRAST     Standing Status:   Future     Standing Expiration Date:   7/30/2021     Scheduling Instructions:      [facility]      [address], [place] [address]:      TIME AND DATE TBD      PLEASE CALL PATIENT ONCE APPROVED TO SCHEDULE       PUSH TO [facility]            Remember that it may take several business days to pre-cert your MRI through your insurance. Our office will contact you as soon as we have the approval. We will not give any test results over the phone. Please call [phone] once you have your test day and time to schedule a follow up with Dr. Fenton Spatz. Order Specific Question:   Reason for exam:     Answer:   r/o right sided L4-L5, L5-S1 HNP, foraminal stenosis       Treatment Plan:  Treatment options were discussed with Jerri Coronado. We did review his plain films and exam findings. He has been having ongoing but worsening symptoms in his back and leg for at least the last 8 to 9 months. Initially was more of a hip and IT band issue but has become more progressively problematic to his right gluteal region lower back and has been having some dysesthesias and achiness to the lateral aspect of his right leg suspicious for possible superimposed radiculopathy.   I would like for him to have an MRI of his lumbar spine to evaluate for right-sided disc protrusion at the L4-5/L5-S1 level and/or foraminal narrowing. We did place him on a Medrol Dosepak to be followed by continuing with his meloxicam 15 mg daily and at least initially I would like for him to continue with his hip rehab and stretching program although we will likely be having him go back to physical therapy for core strengthening as his previous rehab last year was centered primarily on his hip. We will see him back post imaging and icing and activity modification was discussed. He will contact us in the interim with questions or concerns. CC: Dr. Ryan Byrd      This dictation was performed with a verbal recognition program [facility] ) and it was checked for errors. It is possible that there are still dictated errors within this office note. If so, please bring any errors to my attention for an addendum. All efforts were made to ensure that this office note is accurate.

## 2021-06-30 NOTE — PATIENT INSTRUCTIONS
Stop meloxicam for now. Take Medrol first for 6 days. This is a steroid pack. Flip the package over to the foil side and the directions will tell you to start with 6 pills the first day, 5 pills the second day, etc. Please do not take any other anti-inflammatories with the medrol dose peyton as this can upset your stomach. If something else is needed, you may take extra strength tylenol.      Once you are finished with the medrol, then you may re-start or start your anti-inflammatory: meloxicam 1x/day

## 2021-07-14 ENCOUNTER — OFFICE VISIT (OUTPATIENT)
Dept: ORTHOPEDIC SURGERY | Age: 26
End: 2021-07-14
Payer: COMMERCIAL

## 2021-07-14 VITALS — BODY MASS INDEX: 25.48 KG/M2 | HEIGHT: 70 IN | WEIGHT: 178 LBS

## 2021-07-14 DIAGNOSIS — M79.18 MYOFASCIAL PAIN SYNDROME OF LUMBAR SPINE: ICD-10-CM

## 2021-07-14 DIAGNOSIS — M54.50 LUMBAR PAIN: Primary | ICD-10-CM

## 2021-07-14 DIAGNOSIS — M51.26 HERNIATED NUCLEUS PULPOSUS, LUMBAR: ICD-10-CM

## 2021-07-14 DIAGNOSIS — M54.16 RIGHT LUMBAR RADICULITIS: ICD-10-CM

## 2021-07-14 PROCEDURE — 99213 OFFICE O/P EST LOW 20 MIN: CPT | Performed by: FAMILY MEDICINE

## 2021-07-14 PROCEDURE — L0625 LO FLEX L1-BELOW L5 PRE OTS: HCPCS | Performed by: FAMILY MEDICINE

## 2021-07-14 RX ORDER — MELOXICAM 15 MG/1
15 TABLET ORAL DAILY
Qty: 30 TABLET | Refills: 3 | Status: SHIPPED | OUTPATIENT
Start: 2021-07-14 | End: 2022-09-12

## 2021-07-14 NOTE — PATIENT INSTRUCTIONS
DR. [facility]  [phone]    [address] [place] [id number]    *MAKE APPOINTMENT FOR 6 WEEKS FROM NOW

## 2021-07-14 NOTE — PROGRESS NOTES
Chief Complaint  Back Pain (F/U LUMBAR MRI)      Follow-up ongoing but worsening mechanical low back pain with right lateral hip and lower leg pain with difficulty running. Review of lumbar imaging. History of Present Illness:  Luna Chen is a 23 y.o. male who is a very pleasant black male who works in IT at [employer] and does sit frequently throughout the day and is a recreational runner who typically would run between 12 to 13 miles per week who is being seen today in kind consultation from Dr. Ray Moctezuma for evaluation of progressively worsening pain to his right lower back, right gluteal lateral hip and leg. He states that he began noticing symptoms last October 2020 with the insidious onset of pain to the lateral aspect of his hip which she attributed to running. He did see Dr. Roberta Pedersen at that time was appropriately started him in physical therapy which did improve his symptoms partially. He has been diligently working on a stretching program but states that over the last several months he has noticed worsening of pain to the right side of his lower back right gluteal region and is now having some discomfort which she describes as an achiness to the lateral aspect of his right leg suspicious for an occult L5 radiculopathy. He feels tight and does have pain with prolonged sitting and positional changes as well as distance walking. Has had to quit running. He is able to walk but does occasionally at times have to stop with this. He did see Dr. Roberta Pedersen in the office couple weeks ago who placed him on meloxicam and asked that we see him today for orthopedic and sports consultation. He has no previous history of groin pain or lumbar pain. He is no previous history of mechanical back pain or disc herniation. He has been seen today for orthopedic and sports consultation with lumbar imaging.     Estelle Fay was initially seen in the office on 6/30/2021 for evaluation of his worsening mechanical low back pain with possible right occult radiculopathy. He presents back today stating his symptoms may have improved about 25%. He is yet to get back into physical therapy but has been working on his hip stretching program.  He did have his MRI performed at [facility] on 7/5/2021 and did show evidence of a posterior disc protrusion eccentric to the right with some impingement upon the right L5 nerve root in the recess likely contributing to his symptoms. He would rate his improvement about 25% following his Medrol pack but is only inconsistently taking his anti-inflammatories. Once again he has been having symptoms since October 2020 and he does recall that when he was on the Medrol pack at the higher doses he did not have near as much right leg pain with achiness. His goal is to be able to get back into running. Last night pain. He does deny neurogenic bowel or bladder symptoms. Pain Assessment  Location of Pain: Back  Severity of Pain: 4  Quality of Pain: Throbbing, Sharp, Dull, Aching  Duration of Pain: Persistent  Frequency of Pain: Constant  Aggravating Factors: Other (Comment)  Limiting Behavior: Yes  Relieving Factors: Rest, Nsaids  Result of Injury: No  Work-Related Injury: No  Are there other pain locations you wish to document?: No       Medical History  Patient's medications, allergies, past medical, surgical, social and family histories were reviewed and updated as appropriate. Review of Systems  Pertinent items are noted in HPI  Review of systems reviewed from Patient History Form dated on 6/30/2021 and available in the patient's chart under the Media tab. Vital Signs  There were no vitals filed for this visit. General Exam:     Constitutional: Patient is adequately groomed with no evidence of malnutrition  DTRs: Deep tendon reflexes are intact  Mental Status: The patient is oriented to time, place and person. The patient's mood and affect are appropriate.   Lymphatic: The lymphatic examination bilaterally reveals all areas to be without enlargement or induration. Vascular: Examination reveals no swelling or calf tenderness. Peripheral pulses are palpable and 2+. Neurological: The patient has good coordination. There is no weakness or sensory deficit. Lumbar/Sacral Spine Examination  Inspection: There is no high-grade deformity or soft tissue swelling. No palpable spasm. Palpation: Does have some ongoing tenderness along lumbar paraspinals as well as lower lumbar facets with more central gluteal and posterior lateral gluteal tenderness on the right. He has mild tenderness over the right greater trochanteric bursa and IT band. Rang of Motion: Reasonable lumbar motion and some pain to the lower back with side loading on the right producing some gluteal discomfort but this does not seem to radiate pain into his lower extremities currently. Strength: Mild hip weakness 4 out of 5 with hip flexion and abduction. Hamstrings and IT band somewhat tight. Special Tests: He does have an equivocal straight leg raise on the right and negative on the left. Trace positive Tonya's test.  Negative logroll testing. Skin: There are no rashes, ulcerations or lesions. Distal motor sensory and vascular exams intact. Gait: Fluid smooth gait    Reflexes:  Symmetrically preserved     Additional Comments:     Additional Examinations:  Contralateral Exam: Examination of the left hip reveals intact skin. The patient demonstrates full painless range of motion with regards to flexion, abduction, internal and external rotation. There is not tenderness about the greater trochanter. There is a negative straight leg raise against resistance. Strength is 5/5 thorough out all planes. Right Lower Extremity: Examination of the right lower extremity does not show any tenderness, deformity or injury. Range of motion is unremarkable. There is no gross instability.   There are no rashes, ulcerations or lesions. Strength and tone are normal.  Left Lower Extremity: Examination of the left lower extremity does not show any tenderness, deformity or injury. Range of motion is unremarkable. There is no gross instability. There are no rashes, ulcerations or lesions. Strength and tone are normal.      Diagnostic Test Findings: AP lateral and oblique films x2 lumbar spine were reviewed from 6/30/2021 in the office does not show any evidence of acute osseous injury although he may have some mild lower facet arthropathy. His right hip AP and frog films and AP and lateral sacrum pelvis films were reviewed from Dr. Thierry Burris on 6/16/2021 does not show any evidence of high-grade osseous abnormality or degenerative changes although he does exhibit right-sided femoral anteversion. Lumbar spine MRI obtained at [facility] 7/5/2021 is listed above    Assessment: #1.  9+ months status post persistent symptomatically worsening mechanical low back pain with lumbar myofascial pain with right hip IT band and leg pain with MRI documented L4-5 lumbar disc protrusion with abutment right L5 nerve root in the recess     Impression:  Encounter Diagnoses   Name Primary?  Lumbar pain Yes    Herniated nucleus pulposus, lumbar     Right lumbar radiculitis     Myofascial pain syndrome of lumbar spine        Office Procedures:  Orders Placed This Encounter   Procedures    [facility]     Referral Priority:   Routine     Referral Type:   Eval and Treat     Referral Reason:   Specialty Services Required     Requested Specialty:   Physical Therapy     Number of Visits Requested:   1    Bird and Caitlin Extensor Lumbosacral Support Brace (Warm and Form)     Patient was prescribed a Bird and Caitlin Extensor Lumbosacral Support Brace with a pocket.   This orthosis is required for the following reasons:    Reduce pain by restricting mobility of the trunk  Facilitate healing following an injury to the spine or related soft tissues  Support weak spinal muscles    The patient was educated and fit by a healthcare professional with expert knowledge and specialization in brace application while under the direct supervision of the treating physician. Verbal and written instructions for the use of and application of this item were provided. They were instructed to contact the office immediately should the brace result in increased pain, decreased sensation, increased swelling or worsening of the condition. Treatment Plan:  Treatment options were discussed with Sharon Nicely. We did review his plain films, recent lumbar MRI and exam findings. He has been having ongoing but worsening symptoms in his back and leg for at least the last 9 months. Initially was more of a hip and IT band issue but has become more progressively problematic to his right gluteal region lower back and has been having some dysesthesias and achiness to the lateral aspect of his right leg likely related to an occult L5 radiculitis given his MRI findings. I would like for him to take his meloxicam on a consistent basis and get into formal supervised physical therapy primarily for his lumbar spine although he may continue with his right hip and IT band home exercises. Potential for epidural trials were discussed was given a referral to Dr. Kourtney Marin if he is not making progress over the next month with his back and right leg symptoms. Icing and activity modification importance of performing his home exercise program was discussed. We will see him back in about 4 weeks but he will tentatively make an appointment to see Dr. Kourtney Marin in about 6 weeks to consider epidurals if he is not progressing. He will contact us in the interim with questions or concerns. CC: Dr. Madyson Aguilar      This dictation was performed with a verbal recognition program [facility]) and it was checked for errors.  It is possible that there are still dictated errors within this office note. If so, please bring any errors to my attention for an addendum. All efforts were made to ensure that this office note is accurate.

## 2021-07-29 ENCOUNTER — HOSPITAL ENCOUNTER (OUTPATIENT)
Dept: PHYSICAL THERAPY | Age: 26
Setting detail: THERAPIES SERIES
Discharge: HOME | End: 2021-07-29
Payer: COMMERCIAL

## 2021-07-29 PROCEDURE — 97110 THERAPEUTIC EXERCISES: CPT | Performed by: PHYSICAL THERAPIST

## 2021-07-29 PROCEDURE — 97161 PT EVAL LOW COMPLEX 20 MIN: CPT | Performed by: PHYSICAL THERAPIST

## 2021-07-29 PROCEDURE — 97140 MANUAL THERAPY 1/> REGIONS: CPT | Performed by: PHYSICAL THERAPIST

## 2021-07-29 NOTE — PLAN OF CARE
The [facility] and [facility]  [address], [place], [facility]  Phone: [phone]   Fax:     [phone]                                                       Rosalba Mcnally    Dear . Referring Practitioner: Dr. Julio Gimenez,    We had the pleasure of evaluating the following patient for physical therapy services at [address] [place] [address]. A summary of our findings can be found in the initial assessment below. This includes our plan of care. If you have any questions or concerns regarding these findings, please do not hesitate to contact me at the office phone number checked above. Thank you for the referral.       Physician Signature:_______________________________Date:__________________  By signing above (or electronic signature), therapists plan is approved by physician      Patient: Blanca Finn   : 1995   MRN: [id number]  Referring Physician: Referring Practitioner: Dr. Julio Gimenez      Evaluation Date: 2021      Medical Diagnosis Information:  Diagnosis: M54.5- Lumbar pain, M51.26- Herniated nucleus pulposus, lumbar, M54.16 - Right lumbar radiculitis, M79.18- Myofascial pain syndrome of lumbar spine   Treatment Diagnosis: PT treatment diagnosis: low back pain                                         Insurance information: PT Insurance Information: BCBS, 40 visits, no auth, no copay     Precautions/ Contra-indications:   Latex Allergy:  [x]NO      []YES  Preferred Language for Healthcare:   [x]English       []other:    C-SSRS Triggered by Intake questionnaire (Past 2 wk assessment):   [x] No, Questionnaire did not trigger screening.   [] Yes, Patient intake triggered further evaluation      [] C-SSRS Screening completed  [] PCP notified via Plan of Care  [] Emergency services notified    SUBJECTIVE: Patient stated complaint: Patient reports insidious onset of low back/hip pain in October.  He notes gradual worsening over the last few months that has prevented him from leisure running. The patient had an MRI that revealed posterior disc protrusion. He notes that he was rx'd meloxicam and a lumbar brace to worn until his next MD follow up 8/11/21. Relevant Medical History: unremarkable; disc protrusion   Functional Disability Index: Mod DANNY- 6%      Pain Scale: 2-5/10    Easing factors: meloxicam, rest, stretching    Provocative factors: prolonged standing/walking, prolonged sitting     Night Pain: denies currently     Type: []Constant   []Intermittent  [x]Radiating []Localized []other:     Numbness/Tingling: into R LE to foot/lateral calf     Red Flag Symptoms: Denied symptoms associated with more severe pathology    to include loss of bowel and bladder control, fever, chills, nausea, headache, recent weight gain, recent weight loss, night sweats, decreased appetite, fatigue. Functional Limitations/Impairments: [x]Sitting [x]Standing [x]Walking    []Squatting/bending  []Stairs           []ADL's  []Transfers []Sports/Recreation []Other:    Occupation/School:      Sport/recreational activities: running      Living Status/Prior Level of Function:  This patient was independent in ADL's and IADL's prior to onset of symptoms      OBJECTIVE:       Standing Exam Normal Abnormal N/A Comments   Toe walk   x      Heel Walk x      Pelvic Height x      Fwd Bend- (aberrant juttering or innominate mvmt)- Standing Flexion Test x   Mild pain on return to neutral   Extension x      Sacral Sulcus Test (Side Flexion) x      Trendelenburg x      Combined Movements                                   Seated Exam Normal Abnormal N/A Comments   Pelvic Height x      Seated Rotation x      Seated flexion       B hip IR x      SLUMP Test  x  Pain in R LE with LE portion only B          Supine Exam Normal Abnormal N/A Comments   Hip flexion x      Abduction x      ER x      IR x      IRAM/Mukesh x      TONYIR SLR  x  Notes sharp sensation in R lateral calf   Crossed SLR x      Supine to sit       Supine to Sit Test                            Prone Exam Normal Abnormal N/A Comments   Prone knee bend x      Prone hip IR x      B Achilles reflex/Pheasant x      PA/Spring x      Prone Instability test       Sacral Spring/thrust x      Femoral Nerve Tension Test                ROM LEFT RIGHT Comments   Lumbar Flex 85%     Lumbar Ext 100%     Side Bend 75% 75% p! Rotation 85% 85%                  ROM LEFT RIGHT Comments   Hip Flexion WFL     Hip Abd      Hip ER      Hip IR      Hip Extension      Knee Ext      Knee Flex      Hamstring Flex      Piriformis                    Strength LEFT RIGHT Comments   Multifidus      Transverse Ab      Hip Flexors 5/5 4+/5 Pain on R LE   Hip Abductors 4+/5 4+/5    Hip Extensors 4+/5  4+/5                   Myotomes Normal Abnormal Comments   Hip flexion (L1-L2) x     Knee extension (L2-L4) x     Dorsiflexion (L4-L5) x     Great Toe Ext (L5) x     Ankle Eversion (S1-S2) x     Ankle PF(S1-S2) x         Dermatomes Normal Abnormal Comments   inguinal area (L1)  x     anterior mid-thigh (L2) x     distal ant thigh/med knee (L3) x     medial lower leg and foot (L4) x     lateral lower leg and foot (L5)  x Radicular symptoms (sharp)   posterior calf (S1) x     medial calcaneus (S2) x           Reflexes Normal Abnormal Comments   S1-2 Seated achilles x     S1-2 Prone knee bend      L3-4 Patellar tendon x     C5-6 Biceps      C6 Brachioradialis      C7-8 Triceps      Clonus x     Babinski      Darnell's          Joint mobility: lumbar- hypo, hip- normal   []Normal    []Hypo   []Hyper    Palpation: lumbar spinous process/paraspinals,     Functional Mobility/Transfers:WFL    Posture: genu recurvatum     Gait: (include devices/WB status) WFL    Bandages/Dressings/Incisions: NA                         [x] Patient history, allergies, meds reviewed. Medical chart reviewed. See intake form.      Review Of Systems (ROS):  [x]Performed Review of systems (Integumentary, CardioPulmonary, Neurological) by intake and observation. Intake form has been scanned into medical record. Patient has been instructed to contact their primary care physician regarding ROS issues if not already being addressed at this time. Co-morbidities/Complexities (which will affect course of rehabilitation):   []None           Arthritic conditions   []Rheumatoid arthritis (M05.9)  []Osteoarthritis (M19.91)   Cardiovascular conditions   []Hypertension (I10)  []Hyperlipidemia (E78.5)  []Angina pectoris (I20)  []Atherosclerosis (I70)   Musculoskeletal conditions   [x]Disc pathology   []Congenital spine pathologies   []Prior surgical intervention  []Osteoporosis (M81.8)  []Osteopenia (M85.8)   Endocrine conditions   []Hypothyroid (E03.9)  []Hyperthyroid Gastrointestinal conditions   []Constipation (A89.10)   Metabolic conditions   []Morbid obesity (E66.01)  []Diabetes type 1(E10.65) or 2 (E11.65)   []Neuropathy (G60.9)     Pulmonary conditions   []Asthma (J45)  []Coughing   []COPD (J44.9)   Psychological Disorders  []Anxiety (F41.9)  []Depression (F32.9)   []Other:   []Other:          Barriers to/and or personal factors that will affect rehab potential:              []Age  []Sex              []Motivation/Lack of Motivation                        [x]Co-Morbidities              []Cognitive Function, education/learning barriers              []Environmental, home barriers              []profession/work barriers  []past PT/medical experience  []other:  Justification:     Falls Risk Assessment (30 days):   [x] Falls Risk assessed and no intervention required.   [] Falls Risk assessed and Patient requires intervention due to being higher risk   TUG score (>12s at risk):     [] Falls education provided, including       ASSESSMENT:   Functional Impairments:     [x]Noted lumbar/proximal hip hypomobility   []Noted lumbosacral and/or generalized hypermobility   []Decreased Lumbosacral/hip/LE functional ROM   [x]Decreased core/proximal hip strength and neuromuscular control    []Decreased LE functional strength    []Abnormal reflexes/sensation/myotomal/dermatomal deficits  []Reduced balance/proprioceptive control    []other:      Functional Activity Limitations (from functional questionnaire and intake)   [x]Reduced ability to tolerate prolonged functional positions   [x]Reduced ability or difficulty with changes of positions or transfers between positions   []Reduced ability to maintain good posture and demonstrate good body mechanics with sitting, bending, and lifting   [x]Reduced ability to sleep   [] Reduced ability or tolerance with driving and/or computer work   []Reduced ability to perform lifting, reaching, carrying tasks   []Reduced ability to squat   []Reduced ability to forward bend   []Reduced ability to ambulate prolonged functional periods/distances/surfaces   []Reduced ability to ascend/descend stairs   []other:       Participation Restrictions   []Reduced participation in self care activities   []Reduced participation in home management activities   []Reduced participation in work activities   []Reduced participation in social activities. [x]Reduced participation in sport/recreation activities. Classification:   []Signs/symptoms consistent with Lumbar instability/stabilization subgroup. []Signs/symptoms consistent with Lumbar mobilization/manipulation subgroup, myotomes and dermatomes intact.    [x]Signs/symptoms consistent with Lumbar direction specific/centralization subgroup   []Signs/symptoms consistent with Lumbar traction subgroup     []Signs/symptoms consistent with lumbar facet dysfunction   []Signs/symptoms consistent with lumbar stenosis type dysfunction   []Signs/symptoms consistent with nerve root involvement including myotome & dermatome dysfunction   []Signs/symptoms consistent with post-surgical status including: decreased ROM, strength and function. []signs/symptoms consistent with pathology which may benefit from Dry needling     []other:    Prognosis/Rehab Potential:      []Excellent   [x]Good    []Fair   []Poor    Tolerance of evaluation/treatment:    []Excellent   [x]Good    []Fair   []Poor    Physical Therapy Evaluation Complexity Justification  [x] A history of present problem with:  [] no personal factors and/or comorbidities that impact the plan of care;  [x]1-2 personal factors and/or comorbidities that impact the plan of care  []3 personal factors and/or comorbidities that impact the plan of care  [x] An examination of body systems using standardized tests and measures addressing any of the following: body structures and functions (impairments), activity limitations, and/or participation restrictions;:  [] a total of 1-2 or more elements   [x] a total of 3 or more elements   [] a total of 4 or more elements   [x] A clinical presentation with:  [x] stable and/or uncomplicated characteristics   [] evolving clinical presentation with changing characteristics  [] unstable and unpredictable characteristics;   [] Clinical decision making of [] low, [] moderate, [] high complexity using standardized patient assessment instrument and/or measurable assessment of functional outcome. [x] EVAL (LOW) 83164 (typically 20 minutes face-to-face)  [] EVAL (MOD) 07507 (typically 30 minutes face-to-face)  [] EVAL (HIGH) 82420 (typically 45 minutes face-to-face)  [] RE-EVAL       PLAN: Begin PT focusing on: proximal hip mobilizations, LB mobs, LB core activation, proximal hip activation, and HEP    Frequency/Duration:  1-2 days per week for 8 Weeks:  Interventions:  1. Therapeutic exercise including: strength training, ROM/flexibility, NMR and proprioception for the proximal upper extremity and deep neck flexors.   1 Therapeutic exercise including: strength training, ROM/flexibility, NMR and proprioception for the core, hips and bilateral lower extremities. 2. Manual therapy as indicated including Dry Needling/IASTM, STM, PROM, Gr I-IV mobilizations, spinal mobilization/manipulation. 3. Modalities as needed including: thermal agents, E-stim, US, iontophoresis as indicated. 4. Patient education on spine protection, activity modification, progression of HEP. HEP instruction: Can be found in media file. (see scanned forms)      GOALS:  Patient stated goal: return to running    Therapist goals for Patient:Lumbar   Short Term Goals: To be achieved in: 2 weeks  1. Independent in HEP and progression per patient tolerance, in order to prevent re-injury. [] Progressing: [] Met: [] Not Met: [] Adjusted  2. Patient will have a decrease in pain to facilitate improvement in movement, function, and ADLs as indicated by Functional Deficits. [] Progressing: [] Met: [] Not Met: [] Adjusted    Long Term Goals: To be achieved in: 12 weeks  1. Disability index score of 3% or less for the DANNY to assist with reaching prior level of function. [] Progressing: [] Met: [] Not Met: [] Adjusted  2. Patient will demonstrate increased AROM to WNL, good LS mobility, good hip ROM to allow for proper joint functioning as indicated by patients Functional Deficits. [] Progressing: [] Met: [] Not Met: [] Adjusted  3. Patient will demonstrate an increase in Strength to good proximal hip and core activation to allow for proper functional mobility as indicated by patients Functional Deficits. [] Progressing: [] Met: [] Not Met: [] Adjusted  4. Patient will return to running 1 mile without increased symptoms or restriction. [] Progressing: [] Met: [] Not Met: [] Adjusted  5. Patient will be able to sit for 30 minutes without increased symptoms. [] Progressing: [] Met: [] Not Met: [] Adjusted         Electronically signed by:  Zbigniew Gonzalez, SPT, Juan C Kerr PT, OMT-C

## 2021-07-29 NOTE — FLOWSHEET NOTE
[address] [facility]    [facility] Treatment Note/ Progress Report:   Date:  2021    Patient Name:  Negrito Bray    :  1995  MRN: [id number]  Restrictions/Precautions:    Medical/Treatment Diagnosis Information:  · Diagnosis: M54.5- Lumbar pain, M51.26- Herniated nucleus pulposus, lumbar, M54.16 - Right lumbar radiculitis, M79.18- Myofascial pain syndrome of lumbar spine  · Treatment Diagnosis: PT treatment diagnosis: low back pain  Insurance/Certification information:  PT Insurance Information: BCBS, 40 visits, no auth, no copay  Physician Information:  Referring Practitioner: Dr. Quang Snwo of care signed (Y/N):     Date of Patient follow up with Physician:     Is this a Progress Report:     []  Yes  [x]  No        If Yes:  Date Range for reporting period:  Beginning  Ending    Progress report will be due (10 Rx or 30 days whichever is less):        Recertification will be due (POC Duration  / 90 days whichever is less): 21     Date of Surgery:        Visit # Insurance Allowable Auth Required   1 40 []  Yes [x]  No        Functional Scale: Mod DANNY: 6%   Date assessed: 21     Latex Allergy:  [x]NO      []YES  Preferred Language for Healthcare:   [x]English       []other    Pain level:  2-5/10     SUBJECTIVE:  See eval    Type: []Constant   []Intermitment  []Radiating []Localized  []other:     Functional Limitations: []Sitting []Standing []Walking    []Squatting []Stairs                []ADL's  []Driving []Sports/Recreation   []Lifting/reaching     []Grooming    []Carrying []Driving  []Sports/Recreations   []Other:      OBJECTIVE: see eval    ROM Current (R) Current (L)                       Strength                         Access Code: [email or web address]  URL: [email or web address]  Date: 2021  Prepared by: Av Higgins    Exercises  Quadruped Rocking Backward - 2 x daily - 7 x weekly - 10 reps - 10 seconds hold  Quadruped Alternating Leg Extensions - 2 x daily - 7 x weekly - 3 sets - 10 reps  Prone Press Up on Elbows - 1 x daily - 7 x weekly - 3 sets - 10 reps - 5 seconds hold  Supine Piriformis Stretch with Foot on Ground - 2 x daily - 7 x weekly - 5 reps - 30 seconds hold  Supine Hamstring Stretch with Strap - 2 x daily - 7 x weekly - 5 reps - 30 seconds hold    Gait: see eval    Joint mobility: see eval   []Normal    []Hypo   []Hyper    Palpation: see eval    Orthopedic Tests: see eval    RESTRICTIONS/PRECAUTIONS: posterior disc protrusion    Exercises/Interventions:         Stretching Repetitions/Resistance Nots/Last Progression   SKC     DKC     Piriformis Cross Over 3x30\" R  Stretched R only   Figure 4 Piriformis      Hamstring stretch 3x30\" B    Supine ITB     Prone Quad Stretch     Prayer Stretch     Hand Heel Rock 10x5\" ea    Cat/Cow     LTR                    Exercises     Bridge      SLR Flex     SLR Abd     SLR Ext     Clamshells     TA / Multifidus / Abd Hollow     TA March     Prone Plank     Prone on elbows 10x5\"    Side Plank     Quadriped UE/ LE/ Birddog 10x B Cues for level pelvis   Squats     [employer] Kick                 Manual      Hamstring Stretch 3'    SKC 2'    Piriformis Stretch  2'    ITB Stretch      Prone Quad Stretch      Traction 5'    Sacral Decompression  NV   Lumbar PA Grade-  3'    STM: lumbar paraspinals     Supine Lumbar Roll     SL Lumbar      Long Axis Hip Distraction Grade       Therapeutic Exercise and NMR EXR  [x] (74744) Provided verbal/tactile cueing for activities related to strengthening, flexibility, endurance, ROM  for improvements in proximal hip and core control with self care, mobility, lifting and ambulation.  [] (30000) Provided verbal/tactile cueing for activities related to improving balance, coordination, kinesthetic sense, posture, motor skill, proprioception  to assist with core control in self care, mobility, lifting, and ambulation. Therapeutic Activities:    [] (13099 or 10807) Provided verbal/tactile cueing for activities related to improving balance, coordination, kinesthetic sense, posture, motor skill, proprioception and motor activation to allow for proper function  with self care and ADLs  [] (91640) Provided training and instruction to the patient for proper core and proximal hip recruitment and positioning with ambulation re-education     Home Exercise Program:    [x] (14673) Reviewed/Progressed HEP activities related to strengthening, flexibility, endurance, ROM of core, proximal hip and LE for functional self-care, mobility, lifting and ambulation   [] (98957) Reviewed/Progressed HEP activities related to improving balance, coordination, kinesthetic sense, posture, motor skill, proprioception of core, proximal hip and LE for self care, mobility, lifting, and ambulation      Manual Treatments:    [x] (18770) Provided manual therapy to mobilize proximal hip and LS spine soft tissue/joints for the purpose of modulating pain, promoting relaxation,  increasing ROM, reducing/eliminating soft tissue swelling/inflammation/restriction, improving soft tissue extensibility and allowing for proper ROM for normal function with self care, mobility, lifting and ambulation. Modalities:   10' lumbar CP  Charges:  Timed Code Treatment Minutes: 30   Total Treatment Minutes: 60     [x] EVAL (LOW) 76159 (typically 20 minutes face-to-face)  [] EVAL (MOD) 16376 (typically 30 minutes face-to-face)  [] EVAL (HIGH) 67442 (typically 45 minutes face-to-face)  [] RE-EVAL     [x] YB(55608) x   1  [] IONTO  [] NMR (52181) x     [] VASO  [x] Manual (18817) x 1    [] Other:  [] TA x      [] Mech Traction (92280)  [] ES(attended) (49538)      [] ES (un) (19451):     Goals:     Short Term Goals: To be achieved in: 2 weeks  1. Independent in HEP and progression per patient tolerance, in order to prevent re-injury.    [] Progressing: [] Met: [] Not Met: [] Adjusted  2. Patient will have a decrease in pain to facilitate improvement in movement, function, and ADLs as indicated by Functional Deficits. [] Progressing: [] Met: [] Not Met: [] Adjusted    Long Term Goals: To be achieved in: 12 weeks  1. Disability index score of 3% or less for the DANNY to assist with reaching prior level of function. [] Progressing: [] Met: [] Not Met: [] Adjusted  2. Patient will demonstrate increased AROM to WNL, good LS mobility, good hip ROM to allow for proper joint functioning as indicated by patients Functional Deficits. [] Progressing: [] Met: [] Not Met: [] Adjusted  3. Patient will demonstrate an increase in Strength to good proximal hip and core activation to allow for proper functional mobility as indicated by patients Functional Deficits. [] Progressing: [] Met: [] Not Met: [] Adjusted  4. Patient will return to running 1 mile without increased symptoms or restriction. [] Progressing: [] Met: [] Not Met: [] Adjusted  5. Patient will be able to sit for 30 minutes without increased symptoms. [] Progressing: [] Met: [] Not Met: [] Adjusted      Overall Progression Towards Functional goals/ Treatment Progress Update:  [] Patient is progressing as expected towards functional goals listed. [] Progression is slowed due to complexities/Impairments listed. [] Progression has been slowed due to co-morbidities.   [x] Plan just implemented, too soon to assess goals progression <30days   [] Goals require adjustment due to lack of progress  [] Patient is not progressing as expected and requires additional follow up with physician  [] Other    ASSESSMENT:  See eval    Treatment/Activity Tolerance:  [x] Patient tolerated treatment well [] Patient limited by fatique  [] Patient limited by pain  [] Patient limited by other medical complications  [] Other:     Prognosis: [x] Good [] Fair  [] Poor    Patient Requires Follow-up: [x] Yes  [] No    PLAN: See eval  [] Continue per plan of care [] Alter current plan (see comments)  [x] Plan of care initiated [] Hold pending MD visit [] Discharge        Electronically signed by: Ashley Lema SPT,  Amadou Magaña PT, OMT-C      Note: If patient does not return for scheduled/ recommended follow up visits, this note will serve as a discharge from care along with most recent update on progress.

## 2021-08-04 ENCOUNTER — HOSPITAL ENCOUNTER (OUTPATIENT)
Dept: PHYSICAL THERAPY | Age: 26
Setting detail: THERAPIES SERIES
Discharge: HOME | End: 2021-08-04
Payer: COMMERCIAL

## 2021-08-04 PROCEDURE — 97110 THERAPEUTIC EXERCISES: CPT | Performed by: PHYSICAL THERAPIST

## 2021-08-04 PROCEDURE — 97112 NEUROMUSCULAR REEDUCATION: CPT | Performed by: PHYSICAL THERAPIST

## 2021-08-04 PROCEDURE — 97140 MANUAL THERAPY 1/> REGIONS: CPT | Performed by: PHYSICAL THERAPIST

## 2021-08-04 NOTE — FLOWSHEET NOTE
[facility] [address] [facility]    Physical Therapy Treatment Note/ Progress Report:   Date:  2021    Patient Name:  Bozena Seth    :  1995  MRN: [id number]  Restrictions/Precautions:    Medical/Treatment Diagnosis Information:  · Diagnosis: M54.5- Lumbar pain, M51.26- Herniated nucleus pulposus, lumbar, M54.16 - Right lumbar radiculitis, M79.18- Myofascial pain syndrome of lumbar spine  · Treatment Diagnosis: PT treatment diagnosis: low back pain  Insurance/Certification information:  PT Insurance Information: BCBS, 40 visits, no auth, no copay  Physician Information:  Referring Practitioner: Dr. Zora Samuels of care signed (Y/N):     Date of Patient follow up with Physician:     Is this a Progress Report:     []  Yes  [x]  No        If Yes:  Date Range for reporting period:  Beginning  Ending    Progress report will be due (10 Rx or 30 days whichever is less):        Recertification will be due (POC Duration  / 90 days whichever is less): 21     Date of Surgery:        Visit # Insurance Allowable Auth Required   2 40 []  Yes [x]  No        Functional Scale: Mod DANNY: 6%   Date assessed: 21     Latex Allergy:  [x]NO      []YES  Preferred Language for Healthcare:   [x]English       []other    Pain level:  2-5/10     SUBJECTIVE:  Patient reports compliance with his HEP. He notes that he has also been wearing the back brace and it has helped decrease some of his symptoms. Type: []Constant   []Intermitment  []Radiating []Localized  []other:     Functional Limitations: []Sitting []Standing []Walking    []Squatting []Stairs                []ADL's  []Driving []Sports/Recreation   []Lifting/reaching     []Grooming    []Carrying []Driving  []Sports/Recreations   []Other:      OBJECTIVE: see eval    ROM Current (R) Current (L)                       Strength                         Access Code: [email or web address]  URL: [email or web address]  Date: 07/29/2021  Prepared by: Donis Huerta    Exercises  Quadruped Rocking Backward - 2 x daily - 7 x weekly - 10 reps - 10 seconds hold  Quadruped Alternating Leg Extensions - 2 x daily - 7 x weekly - 3 sets - 10 reps  Prone Press Up on Elbows - 1 x daily - 7 x weekly - 3 sets - 10 reps - 5 seconds hold  Supine Piriformis Stretch with Foot on Ground - 2 x daily - 7 x weekly - 5 reps - 30 seconds hold  Supine Hamstring Stretch with Strap - 2 x daily - 7 x weekly - 5 reps - 30 seconds hold    Gait: see eval    Joint mobility: see eval   []Normal    []Hypo   []Hyper    Palpation: see eval    Orthopedic Tests: see eval    RESTRICTIONS/PRECAUTIONS: posterior disc protrusion    Exercises/Interventions:         Stretching Repetitions/Resistance Nots/Last Progression   SK     DKC     Piriformis Cross Over   Stretched R only   Figure 4 Piriformis      Hamstring stretch 3x30\" B    Supine ITB     Prone Quad Stretch     Prayer Stretch     Hand Heel Rock     Cat/Cow     LTR                    Exercises     Bridge      SLR Flex     SLR Abd 2x10    SLR Ext     Clamshells     TA / Multifidus / Abd Hollow     TA March     Superman: 2x10 LE only   Prone Plank     Prone on elbows 15x5\"    Prone press up 15x3\"    Side Plank     Quadriped UE/ LE/ Birddog 20x B Cues for level pelvis   Squats     Swiss [employer] Kick: EOT 2x10 B                Manual      Hamstring Stretch 3'    SKC     Piriformis Stretch  '    ITB Stretch      Prone Quad Stretch      Traction 5' prone   Sacral Decompression 3'    Lumbar PA Grade-  3'    STM: lumbar paraspinals     Supine Lumbar Roll     SL Lumbar      Long Axis Hip Distraction Grade       Therapeutic Exercise and NMR EXR  [x] (71844) Provided verbal/tactile cueing for activities related to strengthening, flexibility, endurance, ROM  for improvements in proximal hip and core control with self care, mobility, lifting and ambulation.  [] (57148) Provided verbal/tactile cueing for activities related to To be achieved in: 2 weeks  1. Independent in HEP and progression per patient tolerance, in order to prevent re-injury. [] Progressing: [] Met: [] Not Met: [] Adjusted  2. Patient will have a decrease in pain to facilitate improvement in movement, function, and ADLs as indicated by Functional Deficits. [] Progressing: [] Met: [] Not Met: [] Adjusted    Long Term Goals: To be achieved in: 12 weeks  1. Disability index score of 3% or less for the DANNY to assist with reaching prior level of function. [] Progressing: [] Met: [] Not Met: [] Adjusted  2. Patient will demonstrate increased AROM to WNL, good LS mobility, good hip ROM to allow for proper joint functioning as indicated by patients Functional Deficits. [] Progressing: [] Met: [] Not Met: [] Adjusted  3. Patient will demonstrate an increase in Strength to good proximal hip and core activation to allow for proper functional mobility as indicated by patients Functional Deficits. [] Progressing: [] Met: [] Not Met: [] Adjusted  4. Patient will return to running 1 mile without increased symptoms or restriction. [] Progressing: [] Met: [] Not Met: [] Adjusted  5. Patient will be able to sit for 30 minutes without increased symptoms. [] Progressing: [] Met: [] Not Met: [] Adjusted      Overall Progression Towards Functional goals/ Treatment Progress Update:  [] Patient is progressing as expected towards functional goals listed. [] Progression is slowed due to complexities/Impairments listed. [] Progression has been slowed due to co-morbidities.   [x] Plan just implemented, too soon to assess goals progression <30days   [] Goals require adjustment due to lack of progress  [] Patient is not progressing as expected and requires additional follow up with physician  [] Other    ASSESSMENT:  Patient reports muscle fatigue at the end of the session after treatment additions/novel ex's.fd    Treatment/Activity Tolerance:  [x] Patient tolerated treatment well [] Patient limited by fatique  [] Patient limited by pain  [] Patient limited by other medical complications  [] Other:     Prognosis: [x] Good [] Fair  [] Poor    Patient Requires Follow-up: [x] Yes  [] No    PLAN: See eval  [x] Continue per plan of care [] Alter current plan (see comments)  [] Plan of care initiated [] Hold pending MD visit [] Discharge        Electronically signed by: Farhana Joshi SPT,  Donis Huerta PT, OMT-C      Note: If patient does not return for scheduled/ recommended follow up visits, this note will serve as a discharge from care along with most recent update on progress.

## 2021-08-11 ENCOUNTER — HOSPITAL ENCOUNTER (OUTPATIENT)
Dept: PHYSICAL THERAPY | Age: 26
Setting detail: THERAPIES SERIES
Discharge: HOME | End: 2021-08-11
Payer: COMMERCIAL

## 2021-08-11 ENCOUNTER — OFFICE VISIT (OUTPATIENT)
Dept: ORTHOPEDIC SURGERY | Age: 26
End: 2021-08-11
Payer: COMMERCIAL

## 2021-08-11 DIAGNOSIS — M54.16 RIGHT LUMBAR RADICULITIS: ICD-10-CM

## 2021-08-11 DIAGNOSIS — M54.50 LUMBAR PAIN: Primary | ICD-10-CM

## 2021-08-11 DIAGNOSIS — M76.31 ILIOTIBIAL BAND SYNDROME OF RIGHT SIDE: ICD-10-CM

## 2021-08-11 DIAGNOSIS — M51.26 HERNIATED NUCLEUS PULPOSUS, LUMBAR: ICD-10-CM

## 2021-08-11 PROCEDURE — 97112 NEUROMUSCULAR REEDUCATION: CPT | Performed by: PHYSICAL THERAPIST

## 2021-08-11 PROCEDURE — 99213 OFFICE O/P EST LOW 20 MIN: CPT | Performed by: FAMILY MEDICINE

## 2021-08-11 PROCEDURE — 97140 MANUAL THERAPY 1/> REGIONS: CPT | Performed by: PHYSICAL THERAPIST

## 2021-08-11 PROCEDURE — 97110 THERAPEUTIC EXERCISES: CPT | Performed by: PHYSICAL THERAPIST

## 2021-08-11 NOTE — FLOWSHEET NOTE
The [address] [place] [address] and [facility]    Physical Therapy Treatment Note/ Progress Report:   Date:  2021    Patient Name:  Blanca Finn    :  1995  MRN: [id number]  Restrictions/Precautions:    Medical/Treatment Diagnosis Information:  · Diagnosis: M54.5- Lumbar pain, M51.26- Herniated nucleus pulposus, lumbar, M54.16 - Right lumbar radiculitis, M79.18- Myofascial pain syndrome of lumbar spine  · Treatment Diagnosis: PT treatment diagnosis: low back pain  Insurance/Certification information:  PT Insurance Information: BCBS, 40 visits, no auth, no copay  Physician Information:  Referring Practitioner: Dr. Nick Garrido of care signed (Y/N):     Date of Patient follow up with Physician:     Is this a Progress Report:     []  Yes  [x]  No        If Yes:  Date Range for reporting period:  Beginning  Ending    Progress report will be due (10 Rx or 30 days whichever is less): 3/11/03       Recertification will be due (POC Duration  / 90 days whichever is less): 21     Date of Surgery:        Visit # Insurance Allowable Auth Required   3 40 []  Yes [x]  No        Functional Scale: Mod DANNY: 6%   Date assessed: 21     Latex Allergy:  [x]NO      []YES  Preferred Language for Healthcare:   [x]English       []other    Pain level:  2-5/10     SUBJECTIVE:  Patient reports driving to [place], [place] over the weekend and notes that he did not have increased symptoms in his low back on the drive up or on the return drive. Patient reports having a good follow up appointment with the MD and will go through with an epidural in ~2 weeks.        Type: []Constant   []Intermitment  []Radiating []Localized  []other:     Functional Limitations: []Sitting []Standing []Walking    []Squatting []Stairs                []ADL's  []Driving []Sports/Recreation   []Lifting/reaching     []Grooming    []Carrying []Driving  []Sports/Recreations   []Other:      OBJECTIVE: see eval    ROM Current 20x3\"    TB palloff press 2x10 B green    1/2 kneel: row, lat pulldown 30# 20x B  30# 20x B airex   Side Plank     Quadruped UE/ LE/ Birddog 15x B 1/2 for level pelvis   Squats     Swiss [employer] Kick: EOT 2x10 B                Manual      Hamstring Stretch 3'    SKC     Piriformis Stretch  '    ITB Stretch      Prone Quad Stretch      Traction 5' prone   Sacral Decompression 3'    Lumbar PA Grade-  3'    STM: lumbar paraspinals     Supine Lumbar Roll     SL Lumbar      Long Axis Hip Distraction Grade       Therapeutic Exercise and NMR EXR  [x] (15420) Provided verbal/tactile cueing for activities related to strengthening, flexibility, endurance, ROM  for improvements in proximal hip and core control with self care, mobility, lifting and ambulation.  [] (50400) Provided verbal/tactile cueing for activities related to improving balance, coordination, kinesthetic sense, posture, motor skill, proprioception  to assist with core control in self care, mobility, lifting, and ambulation.      Therapeutic Activities:    [] (21616 or 14651) Provided verbal/tactile cueing for activities related to improving balance, coordination, kinesthetic sense, posture, motor skill, proprioception and motor activation to allow for proper function  with self care and ADLs  [] (42799) Provided training and instruction to the patient for proper core and proximal hip recruitment and positioning with ambulation re-education     Home Exercise Program:    [x] (30476) Reviewed/Progressed HEP activities related to strengthening, flexibility, endurance, ROM of core, proximal hip and LE for functional self-care, mobility, lifting and ambulation   [] (95880) Reviewed/Progressed HEP activities related to improving balance, coordination, kinesthetic sense, posture, motor skill, proprioception of core, proximal hip and LE for self care, mobility, lifting, and ambulation      Manual Treatments:    [x] (42791) Provided manual therapy to mobilize proximal hip and LS spine soft tissue/joints for the purpose of modulating pain, promoting relaxation,  increasing ROM, reducing/eliminating soft tissue swelling/inflammation/restriction, improving soft tissue extensibility and allowing for proper ROM for normal function with self care, mobility, lifting and ambulation. Modalities:     Charges:  Timed Code Treatment Minutes: 50   Total Treatment Minutes: 50     [] EVAL (LOW) 29410 (typically 20 minutes face-to-face)  [] EVAL (MOD) 57452 (typically 30 minutes face-to-face)  [] EVAL (HIGH) 14736 (typically 45 minutes face-to-face)  [] RE-EVAL     [x] OU(65811) x   1  [] IONTO  [x] NMR (65896) x  1   [] VASO  [x] Manual (98611) x 1    [] Other:  [] TA x      [] Mech Traction (63563)  [] ES(attended) (96753)      [] ES (un) (90226):     Goals:     Short Term Goals: To be achieved in: 2 weeks  1. Independent in HEP and progression per patient tolerance, in order to prevent re-injury. [] Progressing: [] Met: [] Not Met: [] Adjusted  2. Patient will have a decrease in pain to facilitate improvement in movement, function, and ADLs as indicated by Functional Deficits. [] Progressing: [] Met: [] Not Met: [] Adjusted    Long Term Goals: To be achieved in: 12 weeks  1. Disability index score of 3% or less for the DANNY to assist with reaching prior level of function. [] Progressing: [] Met: [] Not Met: [] Adjusted  2. Patient will demonstrate increased AROM to WNL, good LS mobility, good hip ROM to allow for proper joint functioning as indicated by patients Functional Deficits. [] Progressing: [] Met: [] Not Met: [] Adjusted  3. Patient will demonstrate an increase in Strength to good proximal hip and core activation to allow for proper functional mobility as indicated by patients Functional Deficits. [] Progressing: [] Met: [] Not Met: [] Adjusted  4. Patient will return to running 1 mile without increased symptoms or restriction.    [] Progressing: [] Met: [] Not Met: [] Adjusted  5. Patient will be able to sit for 30 minutes without increased symptoms. [] Progressing: [] Met: [] Not Met: [] Adjusted      Overall Progression Towards Functional goals/ Treatment Progress Update:  [] Patient is progressing as expected towards functional goals listed. [] Progression is slowed due to complexities/Impairments listed. [] Progression has been slowed due to co-morbidities. [x] Plan just implemented, too soon to assess goals progression <30days   [] Goals require adjustment due to lack of progress  [] Patient is not progressing as expected and requires additional follow up with physician  [] Other    ASSESSMENT:  Patient tolerated tx additions and progressions well this date; has no complaints of issues. Treatment/Activity Tolerance:  [x] Patient tolerated treatment well [] Patient limited by fatique  [] Patient limited by pain  [] Patient limited by other medical complications  [] Other:     Prognosis: [x] Good [] Fair  [] Poor    Patient Requires Follow-up: [x] Yes  [] No    PLAN: See eval  [x] Continue per plan of care [] Alter current plan (see comments)  [] Plan of care initiated [] Hold pending MD visit [] Discharge        Electronically signed by: Edith Taylor, SPT,  Rosa M Gatica PT, OMT-C      Note: If patient does not return for scheduled/ recommended follow up visits, this note will serve as a discharge from care along with most recent update on progress.

## 2021-08-11 NOTE — PROGRESS NOTES
Chief Complaint  Lower Back Pain (FUA LUMBAR)      Follow-up mechanical low back pain with right lateral hip and lower leg pain with right-sided disc protrusion with L5 nerve root impingement and right radiculitis    History of Present Illness:  Vicky Campbell is a 23 y.o. male who is a very pleasant black male who works in IT at [employer] and does sit frequently throughout the day and is a recreational runner who typically would run between 12 to 15 miles per week who is being seen today in kind consultation from Dr. Zeinab Esparza for evaluation of progressively worsening pain to his right lower back, right gluteal lateral hip and leg. He states that he began noticing symptoms last October 2020 with the insidious onset of pain to the lateral aspect of his hip which she attributed to running. He did see Dr. Elsi Han at that time was appropriately started him in physical therapy which did improve his symptoms partially. He has been diligently working on a stretching program but states that over the last several months he has noticed worsening of pain to the right side of his lower back right gluteal region and is now having some discomfort which she describes as an achiness to the lateral aspect of his right leg suspicious for an occult L5 radiculopathy. He feels tight and does have pain with prolonged sitting and positional changes as well as distance walking. Has had to quit running. He is able to walk but does occasionally at times have to stop with this. He did see Dr. Elsi Han in the office couple weeks ago who placed him on meloxicam and asked that we see him today for orthopedic and sports consultation. He has no previous history of groin pain or lumbar pain. He is no previous history of mechanical back pain or disc herniation. He has been seen today for orthopedic and sports consultation with lumbar imaging.     Kim Mariama was initially seen in the office on 6/30/2021 for evaluation of his worsening mechanical low back pain with possible right occult radiculopathy. He presents back today stating his symptoms may have improved about 25%. He is yet to get back into physical therapy but has been working on his hip stretching program.  He did have his MRI performed at [facility] on 7/5/2021 and did show evidence of a posterior disc protrusion eccentric to the right with some impingement upon the right L5 nerve root in the recess likely contributing to his symptoms. He would rate his improvement about 25% following his Medrol pack but is only inconsistently taking his anti-inflammatories. Once again he has been having symptoms since October 2020 and he does recall that when he was on the Medrol pack at the higher doses he did not have near as much right leg pain with achiness. His goal is to be able to get back into running. Last night pain. He does deny neurogenic bowel or bladder symptoms. Talia Kemp was last seen in the office on 7/14/2021 and found to have a right-sided disc protrusion with right L5 nerve root impingement within the recess. Clinically he is making progress and has had couple sessions of therapy thus far. He rates his improvement between 60 to 65%. He still will have some of his lower leg pain if he sits too long with an unsupported lumbar spine or sits on the couch. He is not having pain at night and has been diligent with his home-based exercise program and is continue with his anti-inflammatories. The intensity of the pain is also improving. He does deny neurogenic bowel or bladder symptoms and we did have him make a tentative appointment with Dr. Shalonda Hudson on 8/25/2021. Medical History  Patient's medications, allergies, past medical, surgical, social and family histories were reviewed and updated as appropriate.     Review of Systems  Pertinent items are noted in HPI  Review of systems reviewed from Patient History Form dated on 6/30/2021 and available in the patient's chart under the Media tab. Vital Signs  There were no vitals filed for this visit. General Exam:     Constitutional: Patient is adequately groomed with no evidence of malnutrition  DTRs: Deep tendon reflexes are intact  Mental Status: The patient is oriented to time, place and person. The patient's mood and affect are appropriate. Lymphatic: The lymphatic examination bilaterally reveals all areas to be without enlargement or induration. Vascular: Examination reveals no swelling or calf tenderness. Peripheral pulses are palpable and 2+. Neurological: The patient has good coordination. There is no weakness or sensory deficit. Lumbar/Sacral Spine Examination  Inspection: There is no high-grade deformity or soft tissue swelling. No palpable spasm. Palpation: Does have less prominent tenderness along lumbar paraspinals as well as lower lumbar facets with more central gluteal and posterior lateral gluteal tenderness on the right. He has mild tenderness over the right greater trochanteric bursa and IT band. Rang of Motion: Reasonable lumbar motion and less pain to the lower back with side loading on the right producing some gluteal discomfort but this does not seem to radiate pain into his lower extremities currently. Strength: Proving strength at 4+ out of 5 with hip flexion and abduction. Hamstrings and IT band somewhat tight. Special Tests: He does have an equivocal straight leg raise on the right and negative on the left. Negative Tonya's test.  Negative logroll testing. Skin: There are no rashes, ulcerations or lesions. Distal motor sensory and vascular exams intact. Gait: Fluid smooth gait    Reflexes:  Symmetrically preserved     Additional Comments:     Additional Examinations:  Contralateral Exam: Examination of the left hip reveals intact skin.   The patient demonstrates full painless range of motion with regards to flexion, abduction, internal and external rotation. There is not tenderness about the greater trochanter. There is a negative straight leg raise against resistance. Strength is 5/5 thorough out all planes. Right Lower Extremity: Examination of the right lower extremity does not show any tenderness, deformity or injury. Range of motion is unremarkable. There is no gross instability. There are no rashes, ulcerations or lesions. Strength and tone are normal.  Left Lower Extremity: Examination of the left lower extremity does not show any tenderness, deformity or injury. Range of motion is unremarkable. There is no gross instability. There are no rashes, ulcerations or lesions. Strength and tone are normal.      Diagnostic Test Findings: AP lateral and oblique films x2 lumbar spine were reviewed from 6/30/2021 in the office does not show any evidence of acute osseous injury although he may have some mild lower facet arthropathy. His right hip AP and frog films and AP and lateral sacrum pelvis films were reviewed from Dr. Reba Raygoza on 6/16/2021 does not show any evidence of high-grade osseous abnormality or degenerative changes although he does exhibit right-sided femoral anteversion. Lumbar spine MRI obtained at [facility] 7/5/2021 is listed above    Assessment: #1.  10 months status post symptomatically improving mechanical low back pain with lumbar myofascial pain with right hip IT band and leg pain with MRI documented L4-5 lumbar disc protrusion with abutment right L5 nerve root in the recess     Impression:  Encounter Diagnoses   Name Primary?  Lumbar pain Yes    Herniated nucleus pulposus, lumbar     Right lumbar radiculitis     Iliotibial band syndrome of right side        Office Procedures:  No orders of the defined types were placed in this encounter. Treatment Plan:  Treatment options were discussed with Blanca Finn. We did review his plain films, recent lumbar MRI and exam findings.   He has been having ongoing but worsening symptoms in his back and leg for at least the last 10 months. Initially was more of a hip and IT band issue but has become more progressively problematic to his right gluteal region lower back and has been having some dysesthesias and achiness to the lateral aspect of his right leg likely related to an occult L5 radiculitis given his MRI findings. I would like for him to take his meloxicam on a consistent basis and continue with formal supervised physical therapy primarily for his lumbar spine although he may continue with his right hip and IT band home exercises. His symptoms have improved 6065% he still have right lateral leg symptoms if he sits too long with a nonsupportive chair or on the couch. Thankfully is not having night pain. He will continue with physical therapy. We had previously had him make an appointment with Dr. Tanna Romero which is on 8/25/2021. He was encouraged to bring his actual MRI disc for Dr. Vitor Delgado to review the images and will get his plain films on disc as well. Icing and activity modification and continuation of therapy and his home exercise program and use of his warm-and-form belt was recommended. We will see him back as needed. He will contact us in the interim with questions or concerns. CC: Dr. Rudi Noonan      This dictation was performed with a verbal recognition program [facility] ) and it was checked for errors. It is possible that there are still dictated errors within this office note. If so, please bring any errors to my attention for an addendum. All efforts were made to ensure that this office note is accurate.

## 2021-08-18 ENCOUNTER — HOSPITAL ENCOUNTER (OUTPATIENT)
Dept: PHYSICAL THERAPY | Age: 26
Setting detail: THERAPIES SERIES
Discharge: HOME | End: 2021-08-18
Payer: COMMERCIAL

## 2021-08-18 PROCEDURE — 97110 THERAPEUTIC EXERCISES: CPT | Performed by: PHYSICAL THERAPIST

## 2021-08-18 PROCEDURE — 97140 MANUAL THERAPY 1/> REGIONS: CPT | Performed by: PHYSICAL THERAPIST

## 2021-08-18 PROCEDURE — 97112 NEUROMUSCULAR REEDUCATION: CPT | Performed by: PHYSICAL THERAPIST

## 2021-08-18 NOTE — FLOWSHEET NOTE
[address]    Physical Therapy Treatment Note/ Progress Report:   Date:  2021    Patient Name:  Alessio Yates    :  1995  MRN: [id number]  Restrictions/Precautions:    Medical/Treatment Diagnosis Information:  · Diagnosis: M54.5- Lumbar pain, M51.26- Herniated nucleus pulposus, lumbar, M54.16 - Right lumbar radiculitis, M79.18- Myofascial pain syndrome of lumbar spine  · Treatment Diagnosis: PT treatment diagnosis: low back pain  Insurance/Certification information:  PT Insurance Information: BCBS, 40 visits, no auth, no copay  Physician Information:  Referring Practitioner: Dr. Manuel Cabezas of care signed (Y/N):     Date of Patient follow up with Physician:     Is this a Progress Report:     []  Yes  [x]  No        If Yes:  Date Range for reporting period:  Beginning  Ending    Progress report will be due (10 Rx or 30 days whichever is less):        Recertification will be due (POC Duration  / 90 days whichever is less): 21     Date of Surgery:        Visit # Insurance Allowable Auth Required   4 40 []  Yes [x]  No        Functional Scale: Mod DANNY: 6%   Date assessed: 21     Latex Allergy:  [x]NO      []YES  Preferred Language for Healthcare:   [x]English       []other    Pain level:  2-5/10     SUBJECTIVE:   Not much change over the past week but has continued to feel better in the past couple of weeks overall. BRANDON scheduled for 21. Type: []Constant   []Intermitment  []Radiating []Localized  []other:     Functional Limitations: []Sitting []Standing []Walking    []Squatting []Stairs                []ADL's  []Driving []Sports/Recreation   []Lifting/reaching     []Grooming    []Carrying []Driving  []Sports/Recreations   []Other:      OBJECTIVE: see eval    ROM Current (R) Current (L)                       Strength                         Access Code: [email or web address]  URL: [email or web address]  Date: 07/29/2021  Prepared by: Kenney Ahr    Exercises  Quadruped Rocking Backward - 2 x daily - 7 x weekly - 10 reps - 10 seconds hold  Quadruped Alternating Leg Extensions - 2 x daily - 7 x weekly - 3 sets - 10 reps  Prone Press Up on Elbows - 1 x daily - 7 x weekly - 3 sets - 10 reps - 5 seconds hold  Supine Piriformis Stretch with Foot on Ground - 2 x daily - 7 x weekly - 5 reps - 30 seconds hold  Supine Hamstring Stretch with Strap - 2 x daily - 7 x weekly - 5 reps - 30 seconds hold    Updated : 8/11/21  Access Code: [email or web address]  URL: [email or web address]  Date: 08/11/2021  Prepared by: Kenney Ahr    Exercises  Quadruped Rocking Backward - 2 x daily - 7 x weekly - 10 reps - 10 seconds hold  Supine Piriformis Stretch with Foot on Ground - 2 x daily - 7 x weekly - 5 reps - 30 seconds hold  Supine Hamstring Stretch with Strap - 2 x daily - 7 x weekly - 5 reps - 30 seconds hold  Prone Press Up - 1 x daily - 7 x weekly - 2 sets - 10 reps - 3 seconds hold  Bird Dog - 1 x daily - 7 x weekly - 3 sets - 10 reps  Superman on Table - 1 x daily - 7 x weekly - 2 sets - 10 reps - 3 second hold      Gait: see eval    Joint mobility: see eval   []Normal    []Hypo   []Hyper    Palpation: see eval    Orthopedic Tests: see eval    RESTRICTIONS/PRECAUTIONS: posterior disc protrusion    Exercises/Interventions:         Stretching Repetitions/Resistance Nots/Last Progression   [facility]     DK     Piriformis Cross Over   Stretched R only   Figure 4 Piriformis      Hamstring stretch  HEP   Supine ITB     Prone Quad Stretch     Prayer Stretch     Hand Heel Rock     Cat/Cow     LTR                    Exercises     Bridge      SLR Flex     SLR Abd 2x10    SLR Ext 10x B    Clamshells     TA / Multifidus / Abd Hollow     TA March     Superman: prone on SB 15x5''    Prone Plank     Prone on elbows     Prone press up 20x3\"    TB palloff press 2x10 B green    1/2 kneel: row,                   Lat pulldown 25# 20x B  60# 20x B BOSU  Seated on SB   Side Plank     Quadruped UE/ LE/ Birddog 15x B 1/2 for level pelvis   SB prone walk out 10x5''    Squats     [employer] Kick: EOT 2x10 B                Manual      Hamstring Stretch 3'    SKC     Piriformis Stretch  '    ITB Stretch      Prone Quad Stretch      Traction 5' prone   Sacral Decompression 3'    Lumbar PA Grade-  3'    STM: lumbar paraspinals     Supine Lumbar Roll     SL Lumbar      Long Axis Hip Distraction Grade       Therapeutic Exercise and NMR EXR  [x] (96556) Provided verbal/tactile cueing for activities related to strengthening, flexibility, endurance, ROM  for improvements in proximal hip and core control with self care, mobility, lifting and ambulation.  [] (52470) Provided verbal/tactile cueing for activities related to improving balance, coordination, kinesthetic sense, posture, motor skill, proprioception  to assist with core control in self care, mobility, lifting, and ambulation.      Therapeutic Activities:    [] (01371 or 86091) Provided verbal/tactile cueing for activities related to improving balance, coordination, kinesthetic sense, posture, motor skill, proprioception and motor activation to allow for proper function  with self care and ADLs  [] (53627) Provided training and instruction to the patient for proper core and proximal hip recruitment and positioning with ambulation re-education     Home Exercise Program:    [x] (98793) Reviewed/Progressed HEP activities related to strengthening, flexibility, endurance, ROM of core, proximal hip and LE for functional self-care, mobility, lifting and ambulation   [] (76230) Reviewed/Progressed HEP activities related to improving balance, coordination, kinesthetic sense, posture, motor skill, proprioception of core, proximal hip and LE for self care, mobility, lifting, and ambulation      Manual Treatments:    [x] (53187) Provided manual therapy to mobilize proximal hip and LS spine soft tissue/joints for the purpose of modulating pain, promoting relaxation,  increasing ROM, reducing/eliminating soft tissue swelling/inflammation/restriction, improving soft tissue extensibility and allowing for proper ROM for normal function with self care, mobility, lifting and ambulation. Modalities:     Charges:  Timed Code Treatment Minutes: 50   Total Treatment Minutes: 50     [] EVAL (LOW) 16298 (typically 20 minutes face-to-face)  [] EVAL (MOD) 60122 (typically 30 minutes face-to-face)  [] EVAL (HIGH) 15908 (typically 45 minutes face-to-face)  [] RE-EVAL     [x] ZT(74142) x   1  [] IONTO  [x] NMR (10118) x  1   [] VASO  [x] Manual (74183) x 1    [] Other:  [] TA x      [] Mech Traction (40568)  [] ES(attended) (37657)      [] ES (un) (90874):     Goals:     Short Term Goals: To be achieved in: 2 weeks  1. Independent in HEP and progression per patient tolerance, in order to prevent re-injury. [] Progressing: [] Met: [] Not Met: [] Adjusted  2. Patient will have a decrease in pain to facilitate improvement in movement, function, and ADLs as indicated by Functional Deficits. [] Progressing: [] Met: [] Not Met: [] Adjusted    Long Term Goals: To be achieved in: 12 weeks  1. Disability index score of 3% or less for the DANNY to assist with reaching prior level of function. [] Progressing: [] Met: [] Not Met: [] Adjusted  2. Patient will demonstrate increased AROM to WNL, good LS mobility, good hip ROM to allow for proper joint functioning as indicated by patients Functional Deficits. [] Progressing: [] Met: [] Not Met: [] Adjusted  3. Patient will demonstrate an increase in Strength to good proximal hip and core activation to allow for proper functional mobility as indicated by patients Functional Deficits. [] Progressing: [] Met: [] Not Met: [] Adjusted  4. Patient will return to running 1 mile without increased symptoms or restriction. [] Progressing: [] Met: [] Not Met: [] Adjusted  5.  Patient will be able to sit for 30 minutes without increased symptoms. [] Progressing: [] Met: [] Not Met: [] Adjusted      Overall Progression Towards Functional goals/ Treatment Progress Update:  [] Patient is progressing as expected towards functional goals listed. [] Progression is slowed due to complexities/Impairments listed. [] Progression has been slowed due to co-morbidities. [x] Plan just implemented, too soon to assess goals progression <30days   [] Goals require adjustment due to lack of progress  [] Patient is not progressing as expected and requires additional follow up with physician  [] Other    ASSESSMENT:  Tolerated Rx well and is responding well to extension based exercise program.      Treatment/Activity Tolerance:  [x] Patient tolerated treatment well [] Patient limited by fatique  [] Patient limited by pain  [] Patient limited by other medical complications  [] Other:     Prognosis: [x] Good [] Fair  [] Poor    Patient Requires Follow-up: [x] Yes  [] No    PLAN: See eval  [x] Continue per plan of care [] Alter current plan (see comments)  [] Plan of care initiated [] Hold pending MD visit [] Discharge        Electronically signed by:  Amee Strong PT, OMT-C      Note: If patient does not return for scheduled/ recommended follow up visits, this note will serve as a discharge from care along with most recent update on progress.

## 2021-08-30 ENCOUNTER — HOSPITAL ENCOUNTER (OUTPATIENT)
Dept: PHYSICAL THERAPY | Age: 26
Setting detail: THERAPIES SERIES
Discharge: HOME | End: 2021-08-30
Payer: COMMERCIAL

## 2021-08-30 PROCEDURE — 97140 MANUAL THERAPY 1/> REGIONS: CPT | Performed by: SPECIALIST/TECHNOLOGIST

## 2021-08-30 PROCEDURE — 97112 NEUROMUSCULAR REEDUCATION: CPT | Performed by: SPECIALIST/TECHNOLOGIST

## 2021-08-30 PROCEDURE — 97110 THERAPEUTIC EXERCISES: CPT | Performed by: SPECIALIST/TECHNOLOGIST

## 2021-08-30 NOTE — FLOWSHEET NOTE
The [address] [place] [address] and Sports [facility]    Physical Therapy Treatment Note/ Progress Report:   Date:  2021    Patient Name:  Sharon Juarez    :  1995  MRN: [id number]  Restrictions/Precautions:    Medical/Treatment Diagnosis Information:  · Diagnosis: M54.5- Lumbar pain, M51.26- Herniated nucleus pulposus, lumbar, M54.16 - Right lumbar radiculitis, M79.18- Myofascial pain syndrome of lumbar spine  · Treatment Diagnosis: PT treatment diagnosis: low back pain  Insurance/Certification information:  PT Insurance Information: BCBS, 40 visits, no auth, no copay  Physician Information:  Referring Practitioner: Dr. Diogenes Urbina of care signed (Y/N):     Date of Patient follow up with Physician:     Is this a Progress Report:     []  Yes  [x]  No        If Yes:  Date Range for reporting period:  Beginning  Ending    Progress report will be due (10 Rx or 30 days whichever is less):        Recertification will be due (POC Duration  / 90 days whichever is less): 21     Date of Surgery:        Visit # Insurance Allowable Auth Required   5 40 []  Yes [x]  No        Functional Scale: Mod DANNY: 6%   Date assessed: 21     Latex Allergy:  [x]NO      []YES  Preferred Language for Healthcare:   [x]English       []other    Pain level:  2-5/10     SUBJECTIVE:   Not much change over the past week but has continued to feel better in the past couple of weeks overall. BRANDON scheduled for 21. Type: []Constant   []Intermitment  []Radiating []Localized  []other:     Functional Limitations: []Sitting []Standing []Walking    []Squatting []Stairs                []ADL's  []Driving []Sports/Recreation   []Lifting/reaching     []Grooming    []Carrying []Driving  []Sports/Recreations   []Other:      OBJECTIVE: see eval    ROM Current (R) Current (L)                       Strength                         Access Code: [email or web address]  URL: [email or web address]  Date: 07/29/2021  Prepared by: Veneda Flank    Exercises  Quadruped Rocking Backward - 2 x daily - 7 x weekly - 10 reps - 10 seconds hold  Quadruped Alternating Leg Extensions - 2 x daily - 7 x weekly - 3 sets - 10 reps  Prone Press Up on Elbows - 1 x daily - 7 x weekly - 3 sets - 10 reps - 5 seconds hold  Supine Piriformis Stretch with Foot on Ground - 2 x daily - 7 x weekly - 5 reps - 30 seconds hold  Supine Hamstring Stretch with Strap - 2 x daily - 7 x weekly - 5 reps - 30 seconds hold    Updated : 8/11/21  Access Code: [email or web address]  URL: [email or web address]  Date: 08/11/2021  Prepared by: Veneda Flank    Exercises  Quadruped Rocking Backward - 2 x daily - 7 x weekly - 10 reps - 10 seconds hold  Supine Piriformis Stretch with Foot on Ground - 2 x daily - 7 x weekly - 5 reps - 30 seconds hold  Supine Hamstring Stretch with Strap - 2 x daily - 7 x weekly - 5 reps - 30 seconds hold  Prone Press Up - 1 x daily - 7 x weekly - 2 sets - 10 reps - 3 seconds hold  Bird Dog - 1 x daily - 7 x weekly - 3 sets - 10 reps  Superman on Table - 1 x daily - 7 x weekly - 2 sets - 10 reps - 3 second hold      Gait: see eval    Joint mobility: see eval   []Normal    []Hypo   []Hyper    Palpation: see eval    Orthopedic Tests: see eval    RESTRICTIONS/PRECAUTIONS: posterior disc protrusion    Exercises/Interventions:         Stretching Repetitions/Resistance Nots/Last Progression   [facility]     DK     Piriformis Cross Over   Stretched R only   Figure 4 Piriformis      Hamstring stretch  HEP   Supine ITB     Prone Quad Stretch     Prayer Stretch     Hand Heel Rock     Cat/Cow     LTR                    Exercises     Bridge      SLR Flex     SLR Abd 3x10 LLE   SLR Ext 10x B    Clamshells     TA / Multifidus / Abd Hollow     TA March     Superman: prone on SB 15x5''    Prone Plank     Prone on elbows     Prone press up 20x3\"    TB palloff press 2x10 B green    1/2 kneel: row,                   Lat pulldown 25# 20x B  60# 20x B BOSU  Seated on SB   Side Plank     Quadruped UE/ LE/ Birddog 15x B 1/2 for level pelvis   SB prone walk out 10x5''    Squats     [employer] Kick: EOT 2x10 B                Manual      Hamstring Stretch 3'    SKC     Piriformis Stretch  '    ITB Stretch      Prone Quad Stretch      Traction 5' prone   Sacral Decompression 3'    Lumbar PA Grade-  3'    STM: lumbar paraspinals     Supine Lumbar Roll     SL Lumbar      Long Axis Hip Distraction Grade       Therapeutic Exercise and NMR EXR  [x] (36745) Provided verbal/tactile cueing for activities related to strengthening, flexibility, endurance, ROM  for improvements in proximal hip and core control with self care, mobility, lifting and ambulation.  [] (74372) Provided verbal/tactile cueing for activities related to improving balance, coordination, kinesthetic sense, posture, motor skill, proprioception  to assist with core control in self care, mobility, lifting, and ambulation.      Therapeutic Activities:    [] (90015 or 19283) Provided verbal/tactile cueing for activities related to improving balance, coordination, kinesthetic sense, posture, motor skill, proprioception and motor activation to allow for proper function  with self care and ADLs  [] (80702) Provided training and instruction to the patient for proper core and proximal hip recruitment and positioning with ambulation re-education     Home Exercise Program:    [x] (34568) Reviewed/Progressed HEP activities related to strengthening, flexibility, endurance, ROM of core, proximal hip and LE for functional self-care, mobility, lifting and ambulation   [] (61552) Reviewed/Progressed HEP activities related to improving balance, coordination, kinesthetic sense, posture, motor skill, proprioception of core, proximal hip and LE for self care, mobility, lifting, and ambulation      Manual Treatments:    [x] (69677) Provided manual therapy to mobilize proximal hip and LS spine soft tissue/joints for the purpose of modulating pain, promoting relaxation,  increasing ROM, reducing/eliminating soft tissue swelling/inflammation/restriction, improving soft tissue extensibility and allowing for proper ROM for normal function with self care, mobility, lifting and ambulation. Modalities:     Charges:  Timed Code Treatment Minutes: 50   Total Treatment Minutes: 50     [] EVAL (LOW) 30981 (typically 20 minutes face-to-face)  [] EVAL (MOD) 31770 (typically 30 minutes face-to-face)  [] EVAL (HIGH) 51278 (typically 45 minutes face-to-face)  [] RE-EVAL     [x] TI(07588) x   1  [] IONTO  [x] NMR (70311) x  1   [] VASO  [x] Manual (48346) x 1    [] Other:  [] TA x      [] Mech Traction (99204)  [] ES(attended) (14613)      [] ES (un) (85447):     Goals:     Short Term Goals: To be achieved in: 2 weeks  1. Independent in HEP and progression per patient tolerance, in order to prevent re-injury. [] Progressing: [] Met: [] Not Met: [] Adjusted  2. Patient will have a decrease in pain to facilitate improvement in movement, function, and ADLs as indicated by Functional Deficits. [] Progressing: [] Met: [] Not Met: [] Adjusted    Long Term Goals: To be achieved in: 12 weeks  1. Disability index score of 3% or less for the DANNY to assist with reaching prior level of function. [] Progressing: [] Met: [] Not Met: [] Adjusted  2. Patient will demonstrate increased AROM to WNL, good LS mobility, good hip ROM to allow for proper joint functioning as indicated by patients Functional Deficits. [] Progressing: [] Met: [] Not Met: [] Adjusted  3. Patient will demonstrate an increase in Strength to good proximal hip and core activation to allow for proper functional mobility as indicated by patients Functional Deficits. [] Progressing: [] Met: [] Not Met: [] Adjusted  4. Patient will return to running 1 mile without increased symptoms or restriction. [] Progressing: [] Met: [] Not Met: [] Adjusted  5.  Patient will be able to sit for 30 minutes without increased symptoms. [] Progressing: [] Met: [] Not Met: [] Adjusted      Overall Progression Towards Functional goals/ Treatment Progress Update:  [] Patient is progressing as expected towards functional goals listed. [] Progression is slowed due to complexities/Impairments listed. [] Progression has been slowed due to co-morbidities. [x] Plan just implemented, too soon to assess goals progression <30days   [] Goals require adjustment due to lack of progress  [] Patient is not progressing as expected and requires additional follow up with physician  [] Other    ASSESSMENT:  Tolerated Rx well and is responding well to extension based exercise program.      Treatment/Activity Tolerance:  [x] Patient tolerated treatment well [] Patient limited by fatique  [] Patient limited by pain  [] Patient limited by other medical complications  [] Other:     Prognosis: [x] Good [] Fair  [] Poor    Patient Requires Follow-up: [x] Yes  [] No    PLAN: See eval  [x] Continue per plan of care [] Alter current plan (see comments)  [] Plan of care initiated [] Hold pending MD visit [] Discharge        Electronically signed by:  Tiffany Ozuna, PTA  [id number], John Callaway, PT, MPT       Note: If patient does not return for scheduled/ recommended follow up visits, this note will serve as a discharge from care along with most recent update on progress.

## 2021-09-08 ENCOUNTER — HOSPITAL ENCOUNTER (OUTPATIENT)
Dept: PHYSICAL THERAPY | Age: 26
Setting detail: THERAPIES SERIES
Discharge: HOME | End: 2021-09-08
Payer: COMMERCIAL

## 2021-09-08 PROCEDURE — 97110 THERAPEUTIC EXERCISES: CPT | Performed by: PHYSICAL THERAPIST

## 2021-09-08 PROCEDURE — 97140 MANUAL THERAPY 1/> REGIONS: CPT | Performed by: PHYSICAL THERAPIST

## 2021-09-08 PROCEDURE — 97112 NEUROMUSCULAR REEDUCATION: CPT | Performed by: PHYSICAL THERAPIST

## 2021-09-08 NOTE — FLOWSHEET NOTE
[address] [facility]    Physical Therapy Treatment Note/ Progress Report:   Date:  2021    Patient Name:  Ofelia Greer    :  1995  MRN: [id number]  Restrictions/Precautions:    Medical/Treatment Diagnosis Information:  · Diagnosis: M54.5- Lumbar pain, M51.26- Herniated nucleus pulposus, lumbar, M54.16 - Right lumbar radiculitis, M79.18- Myofascial pain syndrome of lumbar spine  · Treatment Diagnosis: PT treatment diagnosis: low back pain  Insurance/Certification information:  PT Insurance Information: BCBS, 40 visits, no auth, no copay  Physician Information:  Referring Practitioner: Dr. Tammy Arevalo of care signed (Y/N):     Date of Patient follow up with Physician:     Is this a Progress Report:     []  Yes  [x]  No        If Yes:  Date Range for reporting period:  Beginning  Ending    Progress report will be due (10 Rx or 30 days whichever is less):        Recertification will be due (POC Duration  / 90 days whichever is less): 21     Date of Surgery:        Visit # Insurance Allowable Auth Required   6 40 []  Yes [x]  No        Functional Scale: Mod DANNY: 6%   Date assessed: 21     Latex Allergy:  [x]NO      []YES  Preferred Language for Healthcare:   [x]English       []other    Pain level:  2-5/10     SUBJECTIVE:   Reports doing well overall and back continues to improve. Type: []Constant   []Intermitment  []Radiating []Localized  []other:     Functional Limitations: []Sitting []Standing []Walking    []Squatting []Stairs                []ADL's  []Driving []Sports/Recreation   []Lifting/reaching     []Grooming    []Carrying []Driving  []Sports/Recreations   []Other:      OBJECTIVE: see eval    ROM Current (R) Current (L)                       Strength                         Access Code: [email or web address]  URL: [email or web address]  Date: 2021  Prepared by: Paula Winn    Exercises  Quadruped Rocking Backward - 2 x daily - 7 x weekly - 10 reps - 10 seconds hold  Quadruped Alternating Leg Extensions - 2 x daily - 7 x weekly - 3 sets - 10 reps  Prone Press Up on Elbows - 1 x daily - 7 x weekly - 3 sets - 10 reps - 5 seconds hold  Supine Piriformis Stretch with Foot on Ground - 2 x daily - 7 x weekly - 5 reps - 30 seconds hold  Supine Hamstring Stretch with Strap - 2 x daily - 7 x weekly - 5 reps - 30 seconds hold    Updated : 8/11/21  Access Code: [email or web address]  URL: [email or web address]  Date: 08/11/2021  Prepared by: Cb Jordna    Exercises  Quadruped Rocking Backward - 2 x daily - 7 x weekly - 10 reps - 10 seconds hold  Supine Piriformis Stretch with Foot on Ground - 2 x daily - 7 x weekly - 5 reps - 30 seconds hold  Supine Hamstring Stretch with Strap - 2 x daily - 7 x weekly - 5 reps - 30 seconds hold  Prone Press Up - 1 x daily - 7 x weekly - 2 sets - 10 reps - 3 seconds hold  Bird Dog - 1 x daily - 7 x weekly - 3 sets - 10 reps  Superman on Table - 1 x daily - 7 x weekly - 2 sets - 10 reps - 3 second hold      Gait: see eval    Joint mobility: see eval   []Normal    []Hypo   []Hyper    Palpation: see eval    Orthopedic Tests: see eval    RESTRICTIONS/PRECAUTIONS: posterior disc protrusion    Exercises/Interventions:         Stretching Repetitions/Resistance Nots/Last Progression   [facility]     Piriformis Cross Over   Stretched R only   Figure 4 Piriformis      Hamstring stretch  HEP   Supine ITB     Prone Quad Stretch     Prayer Stretch     Hand Heel Rock     Cat/Cow     LTR                    Exercises     Bridge      SLR Flex     SLR Abd 3x10 LLE   SLR Ext 10x B    Clamshells     TA / Multifidus / Abd Hollow     TA March     Superman: prone on SB 15x5''    Prone Plank     Prone on elbows     Prone press up 20x3\"    TB palloff press 2x10 B green    1/2 kneel: row,                   Lat pulldown 25# 20x B  60# 20x B BOSU  Seated on SB   Side Plank     Quadruped UE/ LE/ Birddog 15x B 1/2 for level pelvis   SB prone walk out 10x5''    Squats     [employer] Kick: EOT 2x10 B                Manual      Hamstring Stretch 3'    SKC     Piriformis Stretch  '    ITB Stretch      Prone Quad Stretch      Traction 5' prone   Sacral Decompression 3'    Lumbar PA Grade-  3'    STM: lumbar paraspinals     Supine Lumbar Roll     SL Lumbar      Long Axis Hip Distraction Grade       Therapeutic Exercise and NMR EXR  [x] (52679) Provided verbal/tactile cueing for activities related to strengthening, flexibility, endurance, ROM  for improvements in proximal hip and core control with self care, mobility, lifting and ambulation.  [] (15787) Provided verbal/tactile cueing for activities related to improving balance, coordination, kinesthetic sense, posture, motor skill, proprioception  to assist with core control in self care, mobility, lifting, and ambulation.      Therapeutic Activities:    [] (80274 or 77597) Provided verbal/tactile cueing for activities related to improving balance, coordination, kinesthetic sense, posture, motor skill, proprioception and motor activation to allow for proper function  with self care and ADLs  [] (18764) Provided training and instruction to the patient for proper core and proximal hip recruitment and positioning with ambulation re-education     Home Exercise Program:    [x] (28755) Reviewed/Progressed HEP activities related to strengthening, flexibility, endurance, ROM of core, proximal hip and LE for functional self-care, mobility, lifting and ambulation   [] (82045) Reviewed/Progressed HEP activities related to improving balance, coordination, kinesthetic sense, posture, motor skill, proprioception of core, proximal hip and LE for self care, mobility, lifting, and ambulation      Manual Treatments:    [x] (76067) Provided manual therapy to mobilize proximal hip and LS spine soft tissue/joints for the purpose of modulating pain, promoting relaxation,  increasing ROM, reducing/eliminating soft tissue swelling/inflammation/restriction, improving soft tissue extensibility and allowing for proper ROM for normal function with self care, mobility, lifting and ambulation. Modalities:     Charges:  Timed Code Treatment Minutes: 50   Total Treatment Minutes: 50     [] EVAL (LOW) 00854 (typically 20 minutes face-to-face)  [] EVAL (MOD) 37789 (typically 30 minutes face-to-face)  [] EVAL (HIGH) 29436 (typically 45 minutes face-to-face)  [] RE-EVAL     [x] QC(82460) x   1  [] IONTO  [x] NMR (63510) x  1   [] VASO  [x] Manual (44254) x 1    [] Other:  [] TA x      [] Mech Traction (96803)  [] ES(attended) (61647)      [] ES (un) (31135):     Goals:     Short Term Goals: To be achieved in: 2 weeks  1. Independent in HEP and progression per patient tolerance, in order to prevent re-injury. [] Progressing: [] Met: [] Not Met: [] Adjusted  2. Patient will have a decrease in pain to facilitate improvement in movement, function, and ADLs as indicated by Functional Deficits. [] Progressing: [] Met: [] Not Met: [] Adjusted    Long Term Goals: To be achieved in: 12 weeks  1. Disability index score of 3% or less for the DANNY to assist with reaching prior level of function. [] Progressing: [] Met: [] Not Met: [] Adjusted  2. Patient will demonstrate increased AROM to WNL, good LS mobility, good hip ROM to allow for proper joint functioning as indicated by patients Functional Deficits. [] Progressing: [] Met: [] Not Met: [] Adjusted  3. Patient will demonstrate an increase in Strength to good proximal hip and core activation to allow for proper functional mobility as indicated by patients Functional Deficits. [] Progressing: [] Met: [] Not Met: [] Adjusted  4. Patient will return to running 1 mile without increased symptoms or restriction. [] Progressing: [] Met: [] Not Met: [] Adjusted  5. Patient will be able to sit for 30 minutes without increased symptoms.    [] Progressing: [] Met: [] Not Met: [] Adjusted      Overall Progression Towards Functional goals/ Treatment Progress Update:  [] Patient is progressing as expected towards functional goals listed. [] Progression is slowed due to complexities/Impairments listed. [] Progression has been slowed due to co-morbidities. [x] Plan just implemented, too soon to assess goals progression <30days   [] Goals require adjustment due to lack of progress  [] Patient is not progressing as expected and requires additional follow up with physician  [] Other    ASSESSMENT:  Steadily progressing with reduction of radicular symptoms. Treatment/Activity Tolerance:  [x] Patient tolerated treatment well [] Patient limited by fatique  [] Patient limited by pain  [] Patient limited by other medical complications  [] Other:     Prognosis: [x] Good [] Fair  [] Poor    Patient Requires Follow-up: [x] Yes  [] No    PLAN: See eval  [x] Continue per plan of care [] Alter current plan (see comments)  [] Plan of care initiated [] Hold pending MD visit [] Discharge        Electronically signed by:  Kaci Doss PT, OMT-C         Note: If patient does not return for scheduled/ recommended follow up visits, this note will serve as a discharge from care along with most recent update on progress.

## 2021-09-27 ENCOUNTER — HOSPITAL ENCOUNTER (OUTPATIENT)
Dept: PHYSICAL THERAPY | Age: 26
Setting detail: THERAPIES SERIES
Discharge: HOME | End: 2021-09-27
Payer: COMMERCIAL

## 2021-09-27 PROCEDURE — 97110 THERAPEUTIC EXERCISES: CPT | Performed by: PHYSICAL THERAPIST

## 2021-09-27 PROCEDURE — 97140 MANUAL THERAPY 1/> REGIONS: CPT | Performed by: PHYSICAL THERAPIST

## 2021-09-27 PROCEDURE — 97112 NEUROMUSCULAR REEDUCATION: CPT | Performed by: PHYSICAL THERAPIST

## 2021-09-27 NOTE — PLAN OF CARE
[facility] and [facility]             Physical Therapy Re-Certification Plan of Care/MD UPDATE        Dear Dr Melissa Perry,    We had the pleasure of treating the following patient for physical therapy services at [address] [place] [address]. A summary of our findings can be found in the updated assessment below. This includes our plan of care. If you have any questions or concerns regarding these findings, please do not hesitate to contact me at the office phone number checked above.   Thank you for the referral.     Physician Signature:________________________________Date:__________________  By signing above (or electronic signature), therapists plan is approved by physician    Date Range Of Visits: 21-21  Total Visits to Date: 7  Overall Response to Treatment:   [x]Patient is responding well to treatment and improvement is noted with regards  to goals   []Patient should continue to improve in reasonable time if they continue HEP   []Patient has plateaued and is no longer responding to skilled PT intervention    []Patient is getting worse and would benefit from return to referring MD   []Patient unable to adhere to initial POC   []Other:   Plan:  Continue 1x wk/ 4wks    Physical Therapy Treatment Note/ Progress Report:   Date:  2021    Patient Name:  Sirisha Mobley    :  1995  MRN: [id number]  Restrictions/Precautions:    Medical/Treatment Diagnosis Information:  · Diagnosis: M54.5- Lumbar pain, M51.26- Herniated nucleus pulposus, lumbar, M54.16 - Right lumbar radiculitis, M79.18- Myofascial pain syndrome of lumbar spine  · Treatment Diagnosis: PT treatment diagnosis: low back pain  Insurance/Certification information:  PT Insurance Information: BCBS, 40 visits, no auth, no copay  Physician Information:  Referring Practitioner: Dr. Juan C Maki of tricia signed (Y/N):     Date of Patient follow up with Physician:     Is this a Progress Report:     [x]  Yes  []  No        If Yes:  Date Range for reporting period:  Beginning 7/29/21  Ending 9/27/21    Progress report will be due (10 Rx or 30 days whichever is less): 18/41/98       Recertification will be due (POC Duration  / 90 days whichever is less): 10/27/21     Date of Surgery:        Visit # Insurance Allowable Auth Required   7 40 []  Yes [x]  No        Functional Scale: Mod DANNY: 2%   Date assessed: 9/27/21     Latex Allergy:  [x]NO      []YES  Preferred Language for Healthcare:   [x]English       []other    Pain level:  1-2/10     SUBJECTIVE:   Doing well overall. Able to get through most days without having to take the anti-inflammatory. Type: []Constant   [x]Intermitment  []Radiating []Localized  []other:     Functional Limitations: []Sitting []Standing []Walking    []Squatting []Stairs                []ADL's  []Driving []Sports/Recreation   []Lifting/reaching     []Grooming    []Carrying []Driving  []Sports/Recreations   []Other:      OBJECTIVE: see eval    ROM Current (R) Current (L)   Trunk flex 100%    Trunk % 100%             Strength     Hip abd 5 5   Hip ext 4+ 4+             Access Code: [email or web address]  URL: [employer]/  Date: 07/29/2021  Prepared by: Kassy Reid    Exercises  Quadruped Rocking Backward - 2 x daily - 7 x weekly - 10 reps - 10 seconds hold  Quadruped Alternating Leg Extensions - 2 x daily - 7 x weekly - 3 sets - 10 reps  Prone Press Up on Elbows - 1 x daily - 7 x weekly - 3 sets - 10 reps - 5 seconds hold  Supine Piriformis Stretch with Foot on Ground - 2 x daily - 7 x weekly - 5 reps - 30 seconds hold  Supine Hamstring Stretch with Strap - 2 x daily - 7 x weekly - 5 reps - 30 seconds hold    Updated : 8/11/21  Access Code: [email or web address]  URL: [employer]/  Date: 08/11/2021  Prepared by: Kassy Reid    Exercises  Quadruped Rocking Backward - 2 x daily - 7 x weekly - 10 reps - 10 seconds hold  Supine Piriformis Stretch with Foot on Ground - 2 x daily - 7 x weekly - 5 reps - 30 seconds hold  Supine Hamstring Stretch with Strap - 2 x daily - 7 x weekly - 5 reps - 30 seconds hold  Prone Press Up - 1 x daily - 7 x weekly - 2 sets - 10 reps - 3 seconds hold  Bird Dog - 1 x daily - 7 x weekly - 3 sets - 10 reps  Superman on Table - 1 x daily - 7 x weekly - 2 sets - 10 reps - 3 second hold      Gait: WNL    Joint mobility:    []Normal    [x]Hypo   []Hyper    Palpation: mild lumbar paraspinals    Orthopedic Tests:     RESTRICTIONS/PRECAUTIONS: posterior disc protrusion    Exercises/Interventions:         Stretching Repetitions/Resistance Nots/Last Progression   [facility]     Piriformis Cross Over   Stretched R only   Figure 4 Piriformis      Hamstring stretch  HEP   Supine ITB     Prone Quad Stretch     Prayer Stretch     Hand Heel Rock     Cat/Cow     LTR                    Exercises     Bridge      SLR Flex     SLR Abd 3x10 B    SLR Ext 10x B    Clamshells     TA / Multifidus / Abd Hollow     TA March     Superman: prone on SB 15x5''    Prone Plank     Prone on elbows     Prone press up 20x3\"    TB palloff press 2x10 B green    1/2 kneel: row,                   Lat pulldown 25# 20x B  70# 30x B BOSU  Seated on SB   Side Plank     Quadruped UE/ LE/ Birddog 15x B 1/2 for level pelvis   SB prone walk out 10x5''    Squats     [employer] Kick: EOT 2x15 B                Manual      Hamstring Stretch 3'    [place]     Piriformis Stretch  '    ITB Stretch      Prone Quad Stretch      Traction 5' prone   Sacral Decompression 3'    Lumbar PA Grade-  3'    STM: lumbar paraspinals     Supine Lumbar Roll     SL Lumbar      Long Axis Hip Distraction Grade       Therapeutic Exercise and NMR EXR  [x] (54588) Provided verbal/tactile cueing for activities related to strengthening, flexibility, endurance, ROM  for improvements in proximal hip and core control with self care, mobility, lifting and ambulation.  [] (87259) Provided verbal/tactile cueing for activities related to improving balance, coordination, kinesthetic sense, posture, motor skill, proprioception  to assist with core control in self care, mobility, lifting, and ambulation. Therapeutic Activities:    [] (28160 or 52781) Provided verbal/tactile cueing for activities related to improving balance, coordination, kinesthetic sense, posture, motor skill, proprioception and motor activation to allow for proper function  with self care and ADLs  [] (69270) Provided training and instruction to the patient for proper core and proximal hip recruitment and positioning with ambulation re-education     Home Exercise Program:    [x] (25948) Reviewed/Progressed HEP activities related to strengthening, flexibility, endurance, ROM of core, proximal hip and LE for functional self-care, mobility, lifting and ambulation   [] (31868) Reviewed/Progressed HEP activities related to improving balance, coordination, kinesthetic sense, posture, motor skill, proprioception of core, proximal hip and LE for self care, mobility, lifting, and ambulation      Manual Treatments:    [x] (43866) Provided manual therapy to mobilize proximal hip and LS spine soft tissue/joints for the purpose of modulating pain, promoting relaxation,  increasing ROM, reducing/eliminating soft tissue swelling/inflammation/restriction, improving soft tissue extensibility and allowing for proper ROM for normal function with self care, mobility, lifting and ambulation.      Modalities:     Charges:  Timed Code Treatment Minutes: 50   Total Treatment Minutes: 50     [] EVAL (LOW) 14186 (typically 20 minutes face-to-face)  [] EVAL (MOD) 93389 (typically 30 minutes face-to-face)  [] EVAL (HIGH) 53291 (typically 45 minutes face-to-face)  [] RE-EVAL     [x] JR(87689) x   1  [] IONTO  [x] NMR (28299) x  1   [] VASO  [x] Manual (16229) x 1    [] Other:  [] TA x      [] Mech Traction (60310)  [] ES(attended) (83094)      [] ES (un) (24677):     Goals:     Short Term Goals: To be achieved in: 2 weeks  1. Independent in HEP and progression per patient tolerance, in order to prevent re-injury. [] Progressing: [] Met: [] Not Met: [] Adjusted  2. Patient will have a decrease in pain to facilitate improvement in movement, function, and ADLs as indicated by Functional Deficits. [] Progressing: [x] Met: [] Not Met: [] Adjusted    Long Term Goals: To be achieved in: 12 weeks  1. Disability index score of 0% or less for the DANNY to assist with reaching prior level of function. [x] Progressing: [] Met: [] Not Met: [x] Adjusted  2. Patient will demonstrate increased AROM to WNL, good LS mobility, good hip ROM to allow for proper joint functioning as indicated by patients Functional Deficits. [] Progressing: [x] Met: [] Not Met: [] Adjusted  3. Patient will demonstrate an increase in Strength to good proximal hip and core activation to allow for proper functional mobility as indicated by patients Functional Deficits. [] Progressing: [x] Met: [] Not Met: [] Adjusted  4. Patient will return to running 1 mile without increased symptoms or restriction. [x] Progressing: [] Met: [] Not Met: [] Adjusted  5. Patient will be able to sit for 30 minutes without increased symptoms. [x] Progressing: [] Met: [] Not Met: [] Adjusted      Overall Progression Towards Functional goals/ Treatment Progress Update:  [x] Patient is progressing as expected towards functional goals listed. [] Progression is slowed due to complexities/Impairments listed. [] Progression has been slowed due to co-morbidities. [] Plan just implemented, too soon to assess goals progression <30days   [] Goals require adjustment due to lack of progress  [] Patient is not progressing as expected and requires additional follow up with physician  [] Other    ASSESSMENT:  Steadily progressing with reduction of radicular symptoms.      Treatment/Activity Tolerance:  [x] Patient tolerated treatment well [] Patient limited by carine  [] Patient limited by pain  [] Patient limited by other medical complications  [] Other:     Prognosis: [x] Good [] Fair  [] Poor    Patient Requires Follow-up: [x] Yes  [] No    PLAN: Continue 1x wk/ 4wks  [x] Continue per plan of care [] Alter current plan (see comments)  [] Plan of care initiated [] Hold pending MD visit [] Discharge        Electronically signed by:  Valery Talamantes PT, OMT-C         Note: If patient does not return for scheduled/ recommended follow up visits, this note will serve as a discharge from care along with most recent update on progress.

## 2021-10-04 ENCOUNTER — HOSPITAL ENCOUNTER (OUTPATIENT)
Dept: PHYSICAL THERAPY | Age: 26
Setting detail: THERAPIES SERIES
Discharge: HOME | End: 2021-10-04
Payer: COMMERCIAL

## 2021-10-04 PROCEDURE — 97110 THERAPEUTIC EXERCISES: CPT | Performed by: PHYSICAL THERAPIST

## 2021-10-04 PROCEDURE — 97140 MANUAL THERAPY 1/> REGIONS: CPT | Performed by: PHYSICAL THERAPIST

## 2021-10-04 PROCEDURE — 97112 NEUROMUSCULAR REEDUCATION: CPT | Performed by: PHYSICAL THERAPIST

## 2021-10-04 NOTE — FLOWSHEET NOTE
The [address] [place] [address] and Sports [facility]        Physical Therapy Treatment Note/ Progress Report:   Date:  10/4/2021    Patient Name:  Mireya Tipton    :  1995  MRN: [id number]  Restrictions/Precautions:    Medical/Treatment Diagnosis Information:  · Diagnosis: M54.5- Lumbar pain, M51.26- Herniated nucleus pulposus, lumbar, M54.16 - Right lumbar radiculitis, M79.18- Myofascial pain syndrome of lumbar spine  · Treatment Diagnosis: PT treatment diagnosis: low back pain  Insurance/Certification information:  PT Insurance Information: Nolan Salgado, 40 visits, no auth, no copay  Physician Information:  Referring Practitioner: Dr. Wei Spine of care signed (Y/N):     Date of Patient follow up with Physician:     Is this a Progress Report:     [x]  Yes  []  No        If Yes:  Date Range for reporting period:  Beginning 21  Ending 21    Progress report will be due (17 Rx or 30 days whichever is less):        Recertification will be due (POC Duration  / 90 days whichever is less): 10/27/21     Date of Surgery:        Visit # Insurance Allowable Auth Required   8 40 []  Yes [x]  No        Functional Scale: Mod DANNY: 2%   Date assessed: 21     Latex Allergy:  [x]NO      []YES  Preferred Language for Healthcare:   [x]English       []other    Pain level:  1-2/10     SUBJECTIVE:   Reports the back is doing well overall. Went to a football game over the weekend and sat on a bleacher seat without a back support. States the back was sore/tight by the end of the game.        Type: []Constant   [x]Intermitment  []Radiating []Localized  []other:     Functional Limitations: []Sitting []Standing []Walking    []Squatting []Stairs                []ADL's  []Driving []Sports/Recreation   []Lifting/reaching     []Grooming    []Carrying []Driving  []Sports/Recreations   []Other:      OBJECTIVE: see eval    ROM Current (R) Current (L)   Trunk flex 100%    Trunk % 100% palloff press 2x10 B green    1/2 kneel: row,                   Lat pulldown 25# 20x B  70# 30x B BOSU  Seated on SB   Side Plank     Quadruped UE/ LE/ Birddog 15x B 1/2 for level pelvis   SB prone walk out 10x5''    Squats     [facility]: abd 60# 30x B    [employer] [place] [employer] Kick: EOT 2x15 B                Manual      Hamstring Stretch 3'    SKC     Piriformis Stretch  '    ITB Stretch      Prone Quad Stretch      Traction 5' prone   Sacral Decompression 3'    Lumbar PA Grade-  3'    STM: lumbar paraspinals     Supine Lumbar Roll     SL Lumbar      Long Axis Hip Distraction Grade       Therapeutic Exercise and NMR EXR  [x] (82507) Provided verbal/tactile cueing for activities related to strengthening, flexibility, endurance, ROM  for improvements in proximal hip and core control with self care, mobility, lifting and ambulation.  [] (96061) Provided verbal/tactile cueing for activities related to improving balance, coordination, kinesthetic sense, posture, motor skill, proprioception  to assist with core control in self care, mobility, lifting, and ambulation.      Therapeutic Activities:    [] (92863 or 47489) Provided verbal/tactile cueing for activities related to improving balance, coordination, kinesthetic sense, posture, motor skill, proprioception and motor activation to allow for proper function  with self care and ADLs  [] (22549) Provided training and instruction to the patient for proper core and proximal hip recruitment and positioning with ambulation re-education     Home Exercise Program:    [x] (50048) Reviewed/Progressed HEP activities related to strengthening, flexibility, endurance, ROM of core, proximal hip and LE for functional self-care, mobility, lifting and ambulation   [] (62561) Reviewed/Progressed HEP activities related to improving balance, coordination, kinesthetic sense, posture, motor skill, proprioception of core, proximal hip and LE for self care, mobility, lifting, and ambulation Manual Treatments:    [x] (04553) Provided manual therapy to mobilize proximal hip and LS spine soft tissue/joints for the purpose of modulating pain, promoting relaxation,  increasing ROM, reducing/eliminating soft tissue swelling/inflammation/restriction, improving soft tissue extensibility and allowing for proper ROM for normal function with self care, mobility, lifting and ambulation. Modalities:     Charges:  Timed Code Treatment Minutes: 50   Total Treatment Minutes: 50     [] EVAL (LOW) 67312 (typically 20 minutes face-to-face)  [] EVAL (MOD) 33954 (typically 30 minutes face-to-face)  [] EVAL (HIGH) 92548 (typically 45 minutes face-to-face)  [] RE-EVAL     [x] ZH(97981) x   1  [] IONTO  [x] NMR (13660) x  1   [] VASO  [x] Manual (98933) x 1    [] Other:  [] TA x      [] Mech Traction (30851)  [] ES(attended) (71423)      [] ES (un) (80254):     Goals:     Short Term Goals: To be achieved in: 2 weeks  1. Independent in HEP and progression per patient tolerance, in order to prevent re-injury. [] Progressing: [] Met: [] Not Met: [] Adjusted  2. Patient will have a decrease in pain to facilitate improvement in movement, function, and ADLs as indicated by Functional Deficits. [] Progressing: [x] Met: [] Not Met: [] Adjusted    Long Term Goals: To be achieved in: 12 weeks  1. Disability index score of 0% or less for the DANNY to assist with reaching prior level of function. [x] Progressing: [] Met: [] Not Met: [x] Adjusted  2. Patient will demonstrate increased AROM to WNL, good LS mobility, good hip ROM to allow for proper joint functioning as indicated by patients Functional Deficits. [] Progressing: [x] Met: [] Not Met: [] Adjusted  3. Patient will demonstrate an increase in Strength to good proximal hip and core activation to allow for proper functional mobility as indicated by patients Functional Deficits. [] Progressing: [x] Met: [] Not Met: [] Adjusted  4.  Patient will return to running 1 mile without increased symptoms or restriction. [x] Progressing: [] Met: [] Not Met: [] Adjusted  5. Patient will be able to sit for 30 minutes without increased symptoms. [x] Progressing: [] Met: [] Not Met: [] Adjusted      Overall Progression Towards Functional goals/ Treatment Progress Update:  [x] Patient is progressing as expected towards functional goals listed. [] Progression is slowed due to complexities/Impairments listed. [] Progression has been slowed due to co-morbidities. [] Plan just implemented, too soon to assess goals progression <30days   [] Goals require adjustment due to lack of progress  [] Patient is not progressing as expected and requires additional follow up with physician  [] Other    ASSESSMENT:  Progressing well with core strength. Treatment/Activity Tolerance:  [x] Patient tolerated treatment well [] Patient limited by fatique  [] Patient limited by pain  [] Patient limited by other medical complications  [] Other:     Prognosis: [x] Good [] Fair  [] Poor    Patient Requires Follow-up: [x] Yes  [] No    PLAN: Continue 1x wk/ 4wks  [x] Continue per plan of care [] Alter current plan (see comments)  [] Plan of care initiated [] Hold pending MD visit [] Discharge        Electronically signed by:  Shaheen Howell PT, OMT-C         Note: If patient does not return for scheduled/ recommended follow up visits, this note will serve as a discharge from care along with most recent update on progress.

## 2021-10-12 ENCOUNTER — HOSPITAL ENCOUNTER (OUTPATIENT)
Dept: PHYSICAL THERAPY | Age: 26
Setting detail: THERAPIES SERIES
Discharge: HOME | End: 2021-10-12
Payer: COMMERCIAL

## 2021-10-12 PROCEDURE — 97112 NEUROMUSCULAR REEDUCATION: CPT | Performed by: PHYSICAL THERAPIST

## 2021-10-12 PROCEDURE — 97140 MANUAL THERAPY 1/> REGIONS: CPT | Performed by: PHYSICAL THERAPIST

## 2021-10-12 PROCEDURE — 97110 THERAPEUTIC EXERCISES: CPT | Performed by: PHYSICAL THERAPIST

## 2021-10-12 NOTE — FLOWSHEET NOTE
The [address] [place] [address] [facility]        Physical Therapy Treatment Note/ Progress Report:   Date:  10/12/2021    Patient Name:  Orlando Nicole    :  1995  MRN: [id number]  Restrictions/Precautions:    Medical/Treatment Diagnosis Information:  · Diagnosis: M54.5- Lumbar pain, M51.26- Herniated nucleus pulposus, lumbar, M54.16 - Right lumbar radiculitis, M79.18- Myofascial pain syndrome of lumbar spine  · Treatment Diagnosis: PT treatment diagnosis: low back pain  Insurance/Certification information:  PT Insurance Information: SushilArvind Dacostadarlingaimee [address]kinssh[address], 40 visits, no auth, no copay  Physician Information:  Referring Practitioner: Dr. Hang Patterson of care signed (Y/N):     Date of Patient follow up with Physician:     Is this a Progress Report:     [x]  Yes  []  No        If Yes:  Date Range for reporting period:  Beginning 21  Ending 21    Progress report will be due (17 Rx or 30 days whichever is less):        Recertification will be due (POC Duration  / 90 days whichever is less): 10/27/21     Date of Surgery:        Visit # Insurance Allowable Auth Required   9 40 []  Yes [x]  No        Functional Scale: Mod DANNY: 2%   Date assessed: 21     Latex Allergy:  [x]NO      []YES  Preferred Language for Healthcare:   [x]English       []other    Pain level:  1-2/10     SUBJECTIVE:  States the low back is still a little achy from the prolonged sitting the prior weekend but is manageable.        Type: []Constant   [x]Intermitment  []Radiating []Localized  []other:     Functional Limitations: []Sitting []Standing []Walking    []Squatting []Stairs                []ADL's  []Driving []Sports/Recreation   []Lifting/reaching     []Grooming    []Carrying []Driving  []Sports/Recreations   []Other:      OBJECTIVE: see eval    ROM Current (R) Current (L)   Trunk flex 100%    Trunk % 100%             Strength     Hip abd 5 5   Hip ext 4+ 4+             Access Code: [email or web address]  URL: [email or web address]  Date: 07/29/2021  Prepared by: 1-4 All    Exercises  Quadruped Rocking Backward - 2 x daily - 7 x weekly - 10 reps - 10 seconds hold  Quadruped Alternating Leg Extensions - 2 x daily - 7 x weekly - 3 sets - 10 reps  Prone Press Up on Elbows - 1 x daily - 7 x weekly - 3 sets - 10 reps - 5 seconds hold  Supine Piriformis Stretch with Foot on Ground - 2 x daily - 7 x weekly - 5 reps - 30 seconds hold  Supine Hamstring Stretch with Strap - 2 x daily - 7 x weekly - 5 reps - 30 seconds hold    Updated : 8/11/21  Access Code: [email or web address]  URL: [email or web address]  Date: 08/11/2021  Prepared by: 1-4 All    Exercises  Quadruped Rocking Backward - 2 x daily - 7 x weekly - 10 reps - 10 seconds hold  Supine Piriformis Stretch with Foot on Ground - 2 x daily - 7 x weekly - 5 reps - 30 seconds hold  Supine Hamstring Stretch with Strap - 2 x daily - 7 x weekly - 5 reps - 30 seconds hold  Prone Press Up - 1 x daily - 7 x weekly - 2 sets - 10 reps - 3 seconds hold  Bird Dog - 1 x daily - 7 x weekly - 3 sets - 10 reps  Superman on Table - 1 x daily - 7 x weekly - 2 sets - 10 reps - 3 second hold      Gait: WNL    Joint mobility:    []Normal    [x]Hypo   []Hyper    Palpation: mild lumbar paraspinals    Orthopedic Tests:     RESTRICTIONS/PRECAUTIONS: posterior disc protrusion    Exercises/Interventions:         Stretching Repetitions/Resistance Nots/Last Progression   [facility]     Piriformis Cross Over   Stretched R only   Figure 4 Piriformis      Hamstring stretch  HEP   Supine ITB     Prone Quad Stretch     Prayer Stretch     Hand Heel Rock     Cat/Cow     LTR                    Exercises     Bridge      SLR Flex     SLR Abd 3x10 B    SLR Ext 10x B    Clamshells     TA / Multifidus / Abd Hollow     TA March     Superman: prone on SB 15x5''    Prone Plank     Prone on elbows     Prone press up 20x3\"    TB palloff press 2x10 B green    1/2 kneel: hi/low row, 25# 30x B ea   BOSU     Prone plank on BOSU 3x30''    Bear Plank w/ shoulder tap 10x B    Quadruped UE/ LE/ Birddog 15x B 1/2 for level pelvis   SB prone walk out 10x5''    Squats     [facility]: abd 60# 30x B    [place] [employer] Kick: EOT 2x15 B                Manual      Hamstring Stretch 3'    SKC     Piriformis Stretch  '    ITB Stretch      Prone Quad Stretch      Traction 5' prone   Sacral Decompression 3'    Lumbar PA Grade-  3'    STM: lumbar paraspinals     Supine Lumbar Roll     SL Lumbar      Long Axis Hip Distraction Grade       Therapeutic Exercise and NMR EXR  [x] (04426) Provided verbal/tactile cueing for activities related to strengthening, flexibility, endurance, ROM  for improvements in proximal hip and core control with self care, mobility, lifting and ambulation.  [] (59247) Provided verbal/tactile cueing for activities related to improving balance, coordination, kinesthetic sense, posture, motor skill, proprioception  to assist with core control in self care, mobility, lifting, and ambulation.      Therapeutic Activities:    [] (62374 or 34653) Provided verbal/tactile cueing for activities related to improving balance, coordination, kinesthetic sense, posture, motor skill, proprioception and motor activation to allow for proper function  with self care and ADLs  [] (65811) Provided training and instruction to the patient for proper core and proximal hip recruitment and positioning with ambulation re-education     Home Exercise Program:    [x] (72719) Reviewed/Progressed HEP activities related to strengthening, flexibility, endurance, ROM of core, proximal hip and LE for functional self-care, mobility, lifting and ambulation   [] (10843) Reviewed/Progressed HEP activities related to improving balance, coordination, kinesthetic sense, posture, motor skill, proprioception of core, proximal hip and LE for self care, mobility, lifting, and ambulation      Manual Treatments:    [x] (79775) Provided manual therapy to mobilize proximal hip and LS spine soft tissue/joints for the purpose of modulating pain, promoting relaxation,  increasing ROM, reducing/eliminating soft tissue swelling/inflammation/restriction, improving soft tissue extensibility and allowing for proper ROM for normal function with self care, mobility, lifting and ambulation. Modalities:     Charges:  Timed Code Treatment Minutes: 50   Total Treatment Minutes: 50     [] EVAL (LOW) 07699 (typically 20 minutes face-to-face)  [] EVAL (MOD) 35948 (typically 30 minutes face-to-face)  [] EVAL (HIGH) 37002 (typically 45 minutes face-to-face)  [] RE-EVAL     [x] UH(43729) x   1  [] IONTO  [x] NMR (51846) x  1   [] VASO  [x] Manual (44331) x 1    [] Other:  [] TA x      [] Mech Traction (12893)  [] ES(attended) (82618)      [] ES (un) (67244):     Goals:     Short Term Goals: To be achieved in: 2 weeks  1. Independent in HEP and progression per patient tolerance, in order to prevent re-injury. [] Progressing: [] Met: [] Not Met: [] Adjusted  2. Patient will have a decrease in pain to facilitate improvement in movement, function, and ADLs as indicated by Functional Deficits. [] Progressing: [x] Met: [] Not Met: [] Adjusted    Long Term Goals: To be achieved in: 12 weeks  1. Disability index score of 0% or less for the DANNY to assist with reaching prior level of function. [x] Progressing: [] Met: [] Not Met: [x] Adjusted  2. Patient will demonstrate increased AROM to WNL, good LS mobility, good hip ROM to allow for proper joint functioning as indicated by patients Functional Deficits. [] Progressing: [x] Met: [] Not Met: [] Adjusted  3. Patient will demonstrate an increase in Strength to good proximal hip and core activation to allow for proper functional mobility as indicated by patients Functional Deficits. [] Progressing: [x] Met: [] Not Met: [] Adjusted  4. Patient will return to running 1 mile without increased symptoms or restriction. [x] Progressing: [] Met: [] Not Met: [] Adjusted  5. Patient will be able to sit for 30 minutes without increased symptoms. [x] Progressing: [] Met: [] Not Met: [] Adjusted      Overall Progression Towards Functional goals/ Treatment Progress Update:  [x] Patient is progressing as expected towards functional goals listed. [] Progression is slowed due to complexities/Impairments listed. [] Progression has been slowed due to co-morbidities. [] Plan just implemented, too soon to assess goals progression <30days   [] Goals require adjustment due to lack of progress  [] Patient is not progressing as expected and requires additional follow up with physician  [] Other    ASSESSMENT:  Tolerated Rx without increase in symptoms. Fair core stability with progression of plank exercises. Treatment/Activity Tolerance:  [x] Patient tolerated treatment well [] Patient limited by fatique  [] Patient limited by pain  [] Patient limited by other medical complications  [] Other:     Prognosis: [x] Good [] Fair  [] Poor    Patient Requires Follow-up: [x] Yes  [] No    PLAN: Continue 1x wk/ 4wks  [x] Continue per plan of care [] Alter current plan (see comments)  [] Plan of care initiated [] Hold pending MD visit [] Discharge        Electronically signed by:  Laura Varma PT, OMT-C         Note: If patient does not return for scheduled/ recommended follow up visits, this note will serve as a discharge from care along with most recent update on progress.

## 2021-10-20 ENCOUNTER — HOSPITAL ENCOUNTER (OUTPATIENT)
Dept: PHYSICAL THERAPY | Age: 26
Setting detail: THERAPIES SERIES
Discharge: HOME | End: 2021-10-20
Payer: COMMERCIAL

## 2021-10-20 PROCEDURE — 97140 MANUAL THERAPY 1/> REGIONS: CPT | Performed by: SPECIALIST/TECHNOLOGIST

## 2021-10-20 PROCEDURE — 97110 THERAPEUTIC EXERCISES: CPT | Performed by: SPECIALIST/TECHNOLOGIST

## 2021-10-20 PROCEDURE — 97112 NEUROMUSCULAR REEDUCATION: CPT | Performed by: SPECIALIST/TECHNOLOGIST

## 2021-10-20 NOTE — FLOWSHEET NOTE
[facility] [address] [place] [address] [facility]        Physical Therapy Treatment Note/ Progress Report:   Date:  10/20/2021    Patient Name:  Dana Centeno    :  1995  MRN: [id number]  Restrictions/Precautions:    Medical/Treatment Diagnosis Information:  · Diagnosis: M54.5- Lumbar pain, M51.26- Herniated nucleus pulposus, lumbar, M54.16 - Right lumbar radiculitis, M79.18- Myofascial pain syndrome of lumbar spine  · Treatment Diagnosis: PT treatment diagnosis: low back pain  Insurance/Certification information:  PT Insurance Information: Jennifer Valente 150, 40 visits, no auth, no copay  Physician Information:  Referring Practitioner: Dr. Farideh Boudreaux of care signed (Y/N):     Date of Patient follow up with Physician:     Is this a Progress Report:     [x]  Yes  []  No        If Yes:  Date Range for reporting period:  Beginning 21  Ending 21    Progress report will be due (17 Rx or 30 days whichever is less):        Recertification will be due (POC Duration  / 90 days whichever is less): 10/27/21     Date of Surgery:        Visit # Insurance Allowable Auth Required   10 40 []  Yes [x]  No        Functional Scale: Mod DANNY: 2%   Date assessed: 21     Latex Allergy:  [x]NO      []YES  Preferred Language for Healthcare:   [x]English       []other    Pain level:  2/10     SUBJECTIVE:  States the low back is still a little achy from the prolonged sitting the prior weekend but is manageable.        Type: []Constant   [x]Intermitment  []Radiating []Localized  []other:     Functional Limitations: []Sitting []Standing []Walking    []Squatting []Stairs                []ADL's  []Driving []Sports/Recreation   []Lifting/reaching     []Grooming    []Carrying []Driving  []Sports/Recreations   []Other:      OBJECTIVE: see eval    ROM Current (R) Current (L)   Trunk flex 100%    Trunk % 100%             Strength     Hip abd 5 5   Hip ext 4+ 4+             Access Code: [email or web address]  URL: [email or web address]  Date: 07/29/2021  Prepared by: Alesha Covert    Exercises  Quadruped Rocking Backward - 2 x daily - 7 x weekly - 10 reps - 10 seconds hold  Quadruped Alternating Leg Extensions - 2 x daily - 7 x weekly - 3 sets - 10 reps  Prone Press Up on Elbows - 1 x daily - 7 x weekly - 3 sets - 10 reps - 5 seconds hold  Supine Piriformis Stretch with Foot on Ground - 2 x daily - 7 x weekly - 5 reps - 30 seconds hold  Supine Hamstring Stretch with Strap - 2 x daily - 7 x weekly - 5 reps - 30 seconds hold    Updated : 8/11/21  Access Code: [email or web address]  URL: [email or web address]  Date: 08/11/2021  Prepared by: [place] Covert    Exercises  Quadruped Rocking Backward - 2 x daily - 7 x weekly - 10 reps - 10 seconds hold  Supine Piriformis Stretch with Foot on Ground - 2 x daily - 7 x weekly - 5 reps - 30 seconds hold  Supine Hamstring Stretch with Strap - 2 x daily - 7 x weekly - 5 reps - 30 seconds hold  Prone Press Up - 1 x daily - 7 x weekly - 2 sets - 10 reps - 3 seconds hold  Bird Dog - 1 x daily - 7 x weekly - 3 sets - 10 reps  Superman on Table - 1 x daily - 7 x weekly - 2 sets - 10 reps - 3 second hold      Gait: WNL    Joint mobility:    []Normal    [x]Hypo   []Hyper    Palpation: mild lumbar paraspinals    Orthopedic Tests:     RESTRICTIONS/PRECAUTIONS: posterior disc protrusion    Exercises/Interventions:         Stretching Repetitions/Resistance Nots/Last Progression   [facility]     Piriformis Cross Over   Stretched R only   Figure 4 Piriformis      Hamstring stretch  HEP   Supine ITB     Prone Quad Stretch     Prayer Stretch     Hand Heel Rock     Cat/Cow     LTR                    Exercises     Bridge      SLR Flex     SLR Abd 3x10 B    SLR Ext 10x B    Clamshells     TA / Multifidus / Abd Hollow     TA March     Superman: prone on SB 15x5''    Prone Plank     Prone on elbows     Prone press up 20x3\"    TB palloff press 2x10 B green    1/2 kneel: hi/low row,                    25# 30x B ea   BOSU     Prone plank on BOSU 3x30''    Bear Plank w/ shoulder tap 10x B    Quadruped UE/ LE/ Birddog 15x B 1/2 for level pelvis + cane   SB prone walk out 10x5''    Squats     [facility]: abd 60# 30x B    [employer] [place] [employer] Kick: EOT 2x15 B                Manual      Hamstring Stretch 3'    SKC     Piriformis Stretch  '    ITB Stretch      Prone Quad Stretch      Traction 5' prone   Sacral Decompression 3'    Lumbar PA Grade-  3'    STM: lumbar paraspinals     Supine Lumbar Roll     SL Lumbar      Long Axis Hip Distraction Grade       Therapeutic Exercise and NMR EXR  [x] (85932) Provided verbal/tactile cueing for activities related to strengthening, flexibility, endurance, ROM  for improvements in proximal hip and core control with self care, mobility, lifting and ambulation.  [] (69919) Provided verbal/tactile cueing for activities related to improving balance, coordination, kinesthetic sense, posture, motor skill, proprioception  to assist with core control in self care, mobility, lifting, and ambulation.      Therapeutic Activities:    [] (65318 or 18511) Provided verbal/tactile cueing for activities related to improving balance, coordination, kinesthetic sense, posture, motor skill, proprioception and motor activation to allow for proper function  with self care and ADLs  [] (48838) Provided training and instruction to the patient for proper core and proximal hip recruitment and positioning with ambulation re-education     Home Exercise Program:    [x] (70127) Reviewed/Progressed HEP activities related to strengthening, flexibility, endurance, ROM of core, proximal hip and LE for functional self-care, mobility, lifting and ambulation   [] (39976) Reviewed/Progressed HEP activities related to improving balance, coordination, kinesthetic sense, posture, motor skill, proprioception of core, proximal hip and LE for self care, mobility, lifting, and ambulation      Manual Treatments:    [x] (70532) Provided manual therapy to mobilize proximal hip and LS spine soft tissue/joints for the purpose of modulating pain, promoting relaxation,  increasing ROM, reducing/eliminating soft tissue swelling/inflammation/restriction, improving soft tissue extensibility and allowing for proper ROM for normal function with self care, mobility, lifting and ambulation. Modalities:     Charges:  Timed Code Treatment Minutes: 50   Total Treatment Minutes: 50     [] EVAL (LOW) 42060 (typically 20 minutes face-to-face)  [] EVAL (MOD) 14754 (typically 30 minutes face-to-face)  [] EVAL (HIGH) 85599 (typically 45 minutes face-to-face)  [] RE-EVAL     [x] UE(79484) x   1  [] IONTO  [x] NMR (22498) x  1   [] VASO  [x] Manual (03336) x 1    [] Other:  [] TA x      [] Mech Traction (28570)  [] ES(attended) (35145)      [] ES (un) (24971):     Goals:     Short Term Goals: To be achieved in: 2 weeks  1. Independent in HEP and progression per patient tolerance, in order to prevent re-injury. [] Progressing: [] Met: [] Not Met: [] Adjusted  2. Patient will have a decrease in pain to facilitate improvement in movement, function, and ADLs as indicated by Functional Deficits. [] Progressing: [x] Met: [] Not Met: [] Adjusted    Long Term Goals: To be achieved in: 12 weeks  1. Disability index score of 0% or less for the DANNY to assist with reaching prior level of function. [x] Progressing: [] Met: [] Not Met: [x] Adjusted  2. Patient will demonstrate increased AROM to WNL, good LS mobility, good hip ROM to allow for proper joint functioning as indicated by patients Functional Deficits. [] Progressing: [x] Met: [] Not Met: [] Adjusted  3. Patient will demonstrate an increase in Strength to good proximal hip and core activation to allow for proper functional mobility as indicated by patients Functional Deficits. [] Progressing: [x] Met: [] Not Met: [] Adjusted  4. Patient will return to running 1 mile without increased symptoms or restriction.    [x] Progressing: [] Met: [] Not Met: [] Adjusted  5. Patient will be able to sit for 30 minutes without increased symptoms. [x] Progressing: [] Met: [] Not Met: [] Adjusted      Overall Progression Towards Functional goals/ Treatment Progress Update:  [x] Patient is progressing as expected towards functional goals listed. [] Progression is slowed due to complexities/Impairments listed. [] Progression has been slowed due to co-morbidities. [] Plan just implemented, too soon to assess goals progression <30days   [] Goals require adjustment due to lack of progress  [] Patient is not progressing as expected and requires additional follow up with physician  [] Other    ASSESSMENT:  Tolerated Rx without increase in symptoms. Fair core stability with progression of plank exercises. Treatment/Activity Tolerance:  [x] Patient tolerated treatment well [] Patient limited by fatique  [] Patient limited by pain  [] Patient limited by other medical complications  [] Other:     Prognosis: [x] Good [] Fair  [] Poor    Patient Requires Follow-up: [x] Yes  [] No    PLAN: D/C NV  [x] Continue per plan of care [] Alter current plan (see comments)  [] Plan of care initiated [] Hold pending MD visit [] Discharge        Electronically signed by:  Vlad Riggs, PTA  [id number], Rocio Dwyer, PT, MPT          Note: If patient does not return for scheduled/ recommended follow up visits, this note will serve as a discharge from care along with most recent update on progress.

## 2021-10-28 ENCOUNTER — HOSPITAL ENCOUNTER (OUTPATIENT)
Dept: PHYSICAL THERAPY | Age: 26
Setting detail: THERAPIES SERIES
Discharge: HOME | End: 2021-10-28
Payer: COMMERCIAL

## 2021-10-28 PROCEDURE — 97112 NEUROMUSCULAR REEDUCATION: CPT | Performed by: PHYSICAL THERAPIST

## 2021-10-28 PROCEDURE — 97110 THERAPEUTIC EXERCISES: CPT | Performed by: PHYSICAL THERAPIST

## 2021-10-28 PROCEDURE — 97140 MANUAL THERAPY 1/> REGIONS: CPT | Performed by: PHYSICAL THERAPIST

## 2021-10-28 NOTE — FLOWSHEET NOTE
The [address] [place] [address] [facility]        Physical Therapy Treatment Note/ Progress Report:   Date:  10/28/2021    Patient Name:  Smooth Power    :  1995  MRN: [id number]  Restrictions/Precautions:    Medical/Treatment Diagnosis Information:  · Diagnosis: M54.5- Lumbar pain, M51.26- Herniated nucleus pulposus, lumbar, M54.16 - Right lumbar radiculitis, M79.18- Myofascial pain syndrome of lumbar spine  · Treatment Diagnosis: PT treatment diagnosis: low back pain  Insurance/Certification information:  PT Insurance Information: SushilArvind Dac[employer] 150, 40 visits, no auth, no copay  Physician Information:  Referring Practitioner: Dr. Manohar Kunz of care signed (Y/N):     Date of Patient follow up with Physician:     Is this a Progress Report:     [x]  Yes  []  No        If Yes:  Date Range for reporting period:  Beginning 21  Ending 21    Progress report will be due (17 Rx or 30 days whichever is less):        Recertification will be due (POC Duration  / 90 days whichever is less): 10/27/21     Date of Surgery:        Visit # Insurance Allowable Auth Required   11 40 []  Yes [x]  No        Functional Scale: Mod DANNY: 2%   Date assessed: 21     Latex Allergy:  [x]NO      []YES  Preferred Language for Healthcare:   [x]English       []other    Pain level:  2/10     SUBJECTIVE:  States the low back is still a little achy from the prolonged sitting the prior weekend but is manageable.        Type: []Constant   [x]Intermitment  []Radiating []Localized  []other:     Functional Limitations: []Sitting []Standing []Walking    []Squatting []Stairs                []ADL's  []Driving []Sports/Recreation   []Lifting/reaching     []Grooming    []Carrying []Driving  []Sports/Recreations   []Other:      OBJECTIVE: see eval    ROM Current (R) Current (L)   Trunk flex 100%    Trunk % 100%             Strength     Hip abd 5 5   Hip ext 4+ 4+             Access Code: [email or web address]  URL: [email or web address]  Date: 07/29/2021  Prepared by: Rocío Pester    Exercises  Quadruped Rocking Backward - 2 x daily - 7 x weekly - 10 reps - 10 seconds hold  Quadruped Alternating Leg Extensions - 2 x daily - 7 x weekly - 3 sets - 10 reps  Prone Press Up on Elbows - 1 x daily - 7 x weekly - 3 sets - 10 reps - 5 seconds hold  Supine Piriformis Stretch with Foot on Ground - 2 x daily - 7 x weekly - 5 reps - 30 seconds hold  Supine Hamstring Stretch with Strap - 2 x daily - 7 x weekly - 5 reps - 30 seconds hold    Updated : 8/11/21  Access Code: [email or web address]  URL: [email or web address]/  Date: 08/11/2021  Prepared by: Rocío Pester    Exercises  Quadruped Rocking Backward - 2 x daily - 7 x weekly - 10 reps - 10 seconds hold  Supine Piriformis Stretch with Foot on Ground - 2 x daily - 7 x weekly - 5 reps - 30 seconds hold  Supine Hamstring Stretch with Strap - 2 x daily - 7 x weekly - 5 reps - 30 seconds hold  Prone Press Up - 1 x daily - 7 x weekly - 2 sets - 10 reps - 3 seconds hold  Bird Dog - 1 x daily - 7 x weekly - 3 sets - 10 reps  Superman on Table - 1 x daily - 7 x weekly - 2 sets - 10 reps - 3 second hold      Access Code: [email or web address]  URL: [employer]/  Date: 10/28/2021  Prepared by: Nohemi Luu with Feet on Swiss Ball - 1 x daily - 7 x weekly - 10 reps - 5 seconds hold  Bear Plank - 1 x daily - 7 x weekly - 10 reps  Standard Plank - 1 x daily - 7 x weekly - 3 reps - 30 seconds hold  Prone Bilateral Hip Extension on Swiss Ball - 1 x daily - 7 x weekly - 2 sets - 15 reps      Gait: WNL    Joint mobility:    []Normal    [x]Hypo   []Hyper    Palpation: mild lumbar paraspinals    Orthopedic Tests:     RESTRICTIONS/PRECAUTIONS: posterior disc protrusion    Exercises/Interventions:         Stretching Repetitions/Resistance Nots/Last Progression   SKC     DKC     Piriformis Cross Over   Stretched R only   Figure 4 Piriformis      Hamstring stretch  HEP   Supine ITB Prone Quad Stretch     Prayer Stretch     Hand Heel Rock     Cat/Cow     LTR                    Exercises     Bridge      SLR Flex     SLR Abd 3x10 B    SLR Ext 10x B    Clamshells     TA / Multifidus / Abd Hollow     TA March     Superman: prone on SB 15x5''    Prone Plank     Prone on elbows     Prone press up 20x3\"    TB palloff press 2x10 B green    1/2 kneel: hi/low row,                    25# 30x B ea   BOSU     Prone plank on BOSU 3x30''    Bear Plank w/ shoulder tap 10x B    Quadruped UE/ LE/ Birddog 15x B 1/2 for level pelvis + cane   SB prone walk out 10x5''    Squats     [facility]: abd 60# 30x B    [place] [employer] Kick: EOT 2x15 B                Manual      Hamstring Stretch 3'    SKC     Piriformis Stretch  '    ITB Stretch      Prone Quad Stretch      Traction 5' prone   Sacral Decompression 3'    Lumbar PA Grade-  3'    STM: lumbar paraspinals     Supine Lumbar Roll     SL Lumbar      Long Axis Hip Distraction Grade       Therapeutic Exercise and NMR EXR  [x] (98447) Provided verbal/tactile cueing for activities related to strengthening, flexibility, endurance, ROM  for improvements in proximal hip and core control with self care, mobility, lifting and ambulation.  [] (42465) Provided verbal/tactile cueing for activities related to improving balance, coordination, kinesthetic sense, posture, motor skill, proprioception  to assist with core control in self care, mobility, lifting, and ambulation.      Therapeutic Activities:    [] (37536 or 20131) Provided verbal/tactile cueing for activities related to improving balance, coordination, kinesthetic sense, posture, motor skill, proprioception and motor activation to allow for proper function  with self care and ADLs  [] (00519) Provided training and instruction to the patient for proper core and proximal hip recruitment and positioning with ambulation re-education     Home Exercise Program:    [x] (23006) Reviewed/Progressed HEP activities related to strengthening, flexibility, endurance, ROM of core, proximal hip and LE for functional self-care, mobility, lifting and ambulation   [] (95943) Reviewed/Progressed HEP activities related to improving balance, coordination, kinesthetic sense, posture, motor skill, proprioception of core, proximal hip and LE for self care, mobility, lifting, and ambulation      Manual Treatments:    [x] (55292) Provided manual therapy to mobilize proximal hip and LS spine soft tissue/joints for the purpose of modulating pain, promoting relaxation,  increasing ROM, reducing/eliminating soft tissue swelling/inflammation/restriction, improving soft tissue extensibility and allowing for proper ROM for normal function with self care, mobility, lifting and ambulation. Modalities:     Charges:  Timed Code Treatment Minutes: 50   Total Treatment Minutes: 50     [] EVAL (LOW) 02387 (typically 20 minutes face-to-face)  [] EVAL (MOD) 94816 (typically 30 minutes face-to-face)  [] EVAL (HIGH) 90495 (typically 45 minutes face-to-face)  [] RE-EVAL     [x] JH(16893) x   1  [] IONTO  [x] NMR (01429) x  1   [] VASO  [x] Manual (84010) x 1    [] Other:  [] TA x      [] Mech Traction (95217)  [] ES(attended) (02637)      [] ES (un) (99858):     Goals:     Short Term Goals: To be achieved in: 2 weeks  1. Independent in HEP and progression per patient tolerance, in order to prevent re-injury. [] Progressing: [] Met: [] Not Met: [] Adjusted  2. Patient will have a decrease in pain to facilitate improvement in movement, function, and ADLs as indicated by Functional Deficits. [] Progressing: [x] Met: [] Not Met: [] Adjusted    Long Term Goals: To be achieved in: 12 weeks  1. Disability index score of 0% or less for the DANNY to assist with reaching prior level of function. [x] Progressing: [] Met: [] Not Met: [x] Adjusted  2.  Patient will demonstrate increased AROM to WNL, good LS mobility, good hip ROM to allow for proper joint functioning as indicated by patients Functional Deficits. [] Progressing: [x] Met: [] Not Met: [] Adjusted  3. Patient will demonstrate an increase in Strength to good proximal hip and core activation to allow for proper functional mobility as indicated by patients Functional Deficits. [] Progressing: [x] Met: [] Not Met: [] Adjusted  4. Patient will return to running 1 mile without increased symptoms or restriction. [x] Progressing: [] Met: [] Not Met: [] Adjusted  5. Patient will be able to sit for 30 minutes without increased symptoms. [x] Progressing: [] Met: [] Not Met: [] Adjusted      Overall Progression Towards Functional goals/ Treatment Progress Update:  [x] Patient is progressing as expected towards functional goals listed. [] Progression is slowed due to complexities/Impairments listed. [] Progression has been slowed due to co-morbidities. [] Plan just implemented, too soon to assess goals progression <30days   [] Goals require adjustment due to lack of progress  [] Patient is not progressing as expected and requires additional follow up with physician  [] Other    ASSESSMENT:  Tolerated Rx without increase in symptoms. Fair core stability with progression of plank exercises. Treatment/Activity Tolerance:  [x] Patient tolerated treatment well [] Patient limited by fatique  [] Patient limited by pain  [] Patient limited by other medical complications  [] Other:     Prognosis: [x] Good [] Fair  [] Poor    Patient Requires Follow-up: [] Yes  [x] No    PLAN: D/C   [] Continue per plan of care [] Alter current plan (see comments)  [] Plan of care initiated [] Hold pending MD visit [x] Discharge        Electronically signed by:  Kiran Rojas PT, OMT-C            Note: If patient does not return for scheduled/ recommended follow up visits, this note will serve as a discharge from care along with most recent update on progress.

## 2022-09-11 NOTE — PROGRESS NOTES
2022    Yohana Slade (:  1995) is a 27 y.o. male, here for evaluation of the following medical concerns:    HPI    Well Adult Physical: Patient here for a comprehensive physical exam.The patient reports {problems:95607}  Do you take any herbs or supplements that were not prescribed by a doctor? {yes/no/not asked:9010} Are you taking calcium supplements? {yes/no:63} Are you taking aspirin daily? {yes/no:63}    Sexual activity: {Persons; sexual partners:705}   Diet: {NBYS:94320}  Exercise: {EXERCISE HNFB:254991341}  Seatbelt use: {YES/NO:26805}    Review of Systems    Prior to Visit Medications    Medication Sig Taking? Authorizing Provider   meloxicam (MOBIC) 15 MG tablet Take 1 tablet by mouth daily  Danna Olson MD   meloxicam (MOBIC) 15 MG tablet Take 1 tablet by mouth daily  Maggie Pena,         No Known Allergies    No past medical history on file. No past surgical history on file. Social History     Socioeconomic History    Marital status: Single     Spouse name: Not on file    Number of children: Not on file    Years of education: Not on file    Highest education level: Not on file   Occupational History    Not on file   Tobacco Use    Smoking status: Never    Smokeless tobacco: Never   Vaping Use    Vaping Use: Never used   Substance and Sexual Activity    Alcohol use: Yes     Comment: occ    Drug use: Never    Sexual activity: Not Currently   Other Topics Concern    Not on file   Social History Narrative    Not on file     Social Determinants of Health     Financial Resource Strain: Not on file   Food Insecurity: Not on file   Transportation Needs: Not on file   Physical Activity: Not on file   Stress: Not on file   Social Connections: Not on file   Intimate Partner Violence: Not on file   Housing Stability: Not on file        No family history on file. There were no vitals filed for this visit.   Estimated body mass index is 25.54 kg/m² as calculated from the following: Height as of 7/14/21: 5' 10\" (1.778 m). Weight as of 7/14/21: 178 lb (80.7 kg). Physical Exam    Separate Identifiable issues addressed today:  {Visit Chronic [facility] (Optional):96870}    ASSESSMENT/PLAN:  There are no diagnoses linked to this encounter. No follow-ups on file. An  electronic signature was used to authenticate this note.     --Fern Gandara DO on 9/11/2022 at 7:51 AM

## 2022-09-12 ENCOUNTER — OFFICE VISIT (OUTPATIENT)
Dept: PRIMARY CARE CLINIC | Age: 27
End: 2022-09-12
Payer: COMMERCIAL

## 2022-09-12 VITALS
TEMPERATURE: 98.4 F | SYSTOLIC BLOOD PRESSURE: 127 MMHG | WEIGHT: 163.4 LBS | DIASTOLIC BLOOD PRESSURE: 80 MMHG | HEIGHT: 71 IN | BODY MASS INDEX: 22.88 KG/M2 | HEART RATE: 57 BPM

## 2022-09-12 DIAGNOSIS — M25.531 RIGHT WRIST PAIN: Primary | ICD-10-CM

## 2022-09-12 DIAGNOSIS — M76.31 ILIOTIBIAL BAND TENDINITIS OF RIGHT SIDE: ICD-10-CM

## 2022-09-12 PROCEDURE — 99214 OFFICE O/P EST MOD 30 MIN: CPT | Performed by: STUDENT IN AN ORGANIZED HEALTH CARE EDUCATION/TRAINING PROGRAM

## 2022-09-12 RX ORDER — MELOXICAM 15 MG/1
15 TABLET ORAL DAILY
Qty: 30 TABLET | Refills: 5 | Status: SHIPPED | OUTPATIENT
Start: 2022-09-12

## 2022-09-12 SDOH — ECONOMIC STABILITY: FOOD INSECURITY: WITHIN THE PAST 12 MONTHS, THE FOOD YOU BOUGHT JUST DIDN'T LAST AND YOU DIDN'T HAVE MONEY TO GET MORE.: NEVER TRUE

## 2022-09-12 SDOH — ECONOMIC STABILITY: FOOD INSECURITY: WITHIN THE PAST 12 MONTHS, YOU WORRIED THAT YOUR FOOD WOULD RUN OUT BEFORE YOU GOT MONEY TO BUY MORE.: NEVER TRUE

## 2022-09-12 ASSESSMENT — ENCOUNTER SYMPTOMS
DIARRHEA: 0
NAUSEA: 0
SHORTNESS OF BREATH: 0
CHEST TIGHTNESS: 0
ABDOMINAL DISTENTION: 0
ABDOMINAL PAIN: 0

## 2022-09-12 ASSESSMENT — PATIENT HEALTH QUESTIONNAIRE - PHQ9
7. TROUBLE CONCENTRATING ON THINGS, SUCH AS READING THE NEWSPAPER OR WATCHING TELEVISION: 0
SUM OF ALL RESPONSES TO PHQ QUESTIONS 1-9: 0
SUM OF ALL RESPONSES TO PHQ9 QUESTIONS 1 & 2: 0
5. POOR APPETITE OR OVEREATING: 0
8. MOVING OR SPEAKING SO SLOWLY THAT OTHER PEOPLE COULD HAVE NOTICED. OR THE OPPOSITE, BEING SO FIGETY OR RESTLESS THAT YOU HAVE BEEN MOVING AROUND A LOT MORE THAN USUAL: 0
9. THOUGHTS THAT YOU WOULD BE BETTER OFF DEAD, OR OF HURTING YOURSELF: 0
3. TROUBLE FALLING OR STAYING ASLEEP: 0
1. LITTLE INTEREST OR PLEASURE IN DOING THINGS: 0
SUM OF ALL RESPONSES TO PHQ QUESTIONS 1-9: 0
6. FEELING BAD ABOUT YOURSELF - OR THAT YOU ARE A FAILURE OR HAVE LET YOURSELF OR YOUR FAMILY DOWN: 0
SUM OF ALL RESPONSES TO PHQ QUESTIONS 1-9: 0
SUM OF ALL RESPONSES TO PHQ QUESTIONS 1-9: 0
10. IF YOU CHECKED OFF ANY PROBLEMS, HOW DIFFICULT HAVE THESE PROBLEMS MADE IT FOR YOU TO DO YOUR WORK, TAKE CARE OF THINGS AT HOME, OR GET ALONG WITH OTHER PEOPLE: 0
4. FEELING TIRED OR HAVING LITTLE ENERGY: 0
2. FEELING DOWN, DEPRESSED OR HOPELESS: 0

## 2022-09-12 ASSESSMENT — SOCIAL DETERMINANTS OF HEALTH (SDOH): HOW HARD IS IT FOR YOU TO PAY FOR THE VERY BASICS LIKE FOOD, HOUSING, MEDICAL CARE, AND HEATING?: NOT HARD AT ALL

## 2022-09-12 NOTE — PROGRESS NOTES
from the following:    Height as of this encounter: 5' 10.5\" (1.791 m). Weight as of this encounter: 163 lb 6.4 oz (74.1 kg). Physical Exam  Constitutional:       Appearance: Normal appearance. He is normal weight. HENT:      Head: Normocephalic and atraumatic. Nose: Nose normal.   Eyes:      Extraocular Movements: Extraocular movements intact. Conjunctiva/sclera: Conjunctivae normal.   Cardiovascular:      Rate and Rhythm: Normal rate. Pulmonary:      Effort: Pulmonary effort is normal.   Musculoskeletal:         General: Tenderness (Gentle squeezing right wrist) present. No swelling or deformity. Skin:     General: Skin is warm and dry. Neurological:      General: No focal deficit present. Mental Status: He is alert and oriented to person, place, and time. Mental status is at baseline. ASSESSMENT/PLAN:  1. Right wrist pain: Could be pathology of the wrist itself, but does seem suspicious for carpal tunnel. Given exercises to do. Continue bracing at night and during aggravating activities. Checking EMG. Referring to hand. - EMG; Future  - Ambulatory referral to Hand Surgery    Return in about 4 weeks (around 10/10/2022) for hand numbness. An electronic signature was used to authenticate this note.     --Timothy Grande, DO on 9/12/2022 at 12:08 PM

## 2022-09-14 ENCOUNTER — PROCEDURE VISIT (OUTPATIENT)
Dept: NEUROLOGY | Age: 27
End: 2022-09-14
Payer: COMMERCIAL

## 2022-09-14 DIAGNOSIS — M25.531 RIGHT WRIST PAIN: ICD-10-CM

## 2022-09-14 PROCEDURE — 95886 MUSC TEST DONE W/N TEST COMP: CPT | Performed by: PSYCHIATRY & NEUROLOGY

## 2022-09-14 PROCEDURE — 95909 NRV CNDJ TST 5-6 STUDIES: CPT | Performed by: PSYCHIATRY & NEUROLOGY

## 2022-09-14 NOTE — PATIENT INSTRUCTIONS
Verbal consent was obtained from patient and/or patient's advocate for in office procedure with Dr. Sherie Orlando (EMG or EEG).

## 2022-10-07 ENCOUNTER — OFFICE VISIT (OUTPATIENT)
Dept: ORTHOPEDIC SURGERY | Age: 27
End: 2022-10-07
Payer: COMMERCIAL

## 2022-10-07 VITALS — HEIGHT: 71 IN | WEIGHT: 163 LBS | BODY MASS INDEX: 22.82 KG/M2 | RESPIRATION RATE: 16 BRPM

## 2022-10-07 DIAGNOSIS — M25.531 RIGHT WRIST PAIN: Primary | ICD-10-CM

## 2022-10-07 PROCEDURE — 99204 OFFICE O/P NEW MOD 45 MIN: CPT | Performed by: ORTHOPAEDIC SURGERY

## 2022-10-07 SDOH — HEALTH STABILITY: PHYSICAL HEALTH: ON AVERAGE, HOW MANY MINUTES DO YOU ENGAGE IN EXERCISE AT THIS LEVEL?: 30 MIN

## 2022-10-07 SDOH — HEALTH STABILITY: PHYSICAL HEALTH: ON AVERAGE, HOW MANY DAYS PER WEEK DO YOU ENGAGE IN MODERATE TO STRENUOUS EXERCISE (LIKE A BRISK WALK)?: 2 DAYS

## 2022-10-07 ASSESSMENT — SOCIAL DETERMINANTS OF HEALTH (SDOH)
WITHIN THE LAST YEAR, HAVE YOU BEEN AFRAID OF YOUR PARTNER OR EX-PARTNER?: NO
WITHIN THE LAST YEAR, HAVE YOU BEEN KICKED, HIT, SLAPPED, OR OTHERWISE PHYSICALLY HURT BY YOUR PARTNER OR EX-PARTNER?: NO
WITHIN THE LAST YEAR, HAVE YOU BEEN HUMILIATED OR EMOTIONALLY ABUSED IN OTHER WAYS BY YOUR PARTNER OR EX-PARTNER?: NO
WITHIN THE LAST YEAR, HAVE TO BEEN RAPED OR FORCED TO HAVE ANY KIND OF SEXUAL ACTIVITY BY YOUR PARTNER OR EX-PARTNER?: NO

## 2022-10-07 NOTE — Clinical Note
Dear  Anel Staton, DO,  Thank you very much for your referral or Mr. Mckee Core to me for evaluation and treatment of his Hand & Wrist condition. I appreciate your confidence in me and thank you for allowing me the opportunity to care for your patients. If I can be of any further assistance to you on this or any other patient, please do not hesitate to contact me. Sincerely,  Ami Traylor.  Therese Ghosh MD

## 2022-10-08 NOTE — PROGRESS NOTES
Mr. Pieter Quevedo is a 27 y.o. right handed   who is seen today in Hand Surgical Consultation at the request of Meena Ordonez DO. He is seen today regarding a 1 year(s) history of Intermittent right Volar forearm Tightness and aching without history of previous injury. He was seen for this concern by his primary care physician; previous treatment has included conservative measures, activity modification, avoidance of bothersome tasks, and splinting of the right wrist.  He reports generalized aching pain located in the Volar distal forearm, no tenderness of the remaining hand or elbow. He  notes today, no neurologic symptoms in the hand. Symptoms have been intermittent and variable over time. I have today reviewed with Pieter Quevedo the clinically relevant, past medical history, medications, allergies,  family history, social history, and Review Of Systems & I have documented any details relevant to today's presenting complaints in my history above. Mr. Frantz Stewart self-reported past medical history, medications, allergies,  family history, social history, and Review Of Systems have been scanned into the chart under the \"Media\" tab. Physical Exam:  Mr. Frantz Stewart most recent vitals:  Vitals  Resp: 16  Height: 5' 10.5\" (179.1 cm)  Weight: 163 lb (73.9 kg)    He is well nourished, oriented to person, place & time. He demonstrates appropriate mood and affect as well as normal gait and station. Skin: Normal in appearance, Normal Color, and Free of Lesions Bilaterally   Digital range of motion is Full bilaterally  Wrist range of motion is equal bilateral otherwise normal bilaterally  Sensation is subjectively normal in the Median Innervated Digits and Ulnar Innervated Digits. All other digits are normally sensate bilaterally  Vascular examination reveals normal and good capillary refill bilaterally. There is no ecchymosis on the Right, normal on the Left.   Swelling is minimal in the Volar distal forearm without focal palpable abnormality or pain. No other digit or hand bilaterally shows sign of swelling. There is no evidence of gross joint instability bilaterally. Muscular strength is clinically appropriate bilaterally. No pain is elicited with palpation of the Radial, Ulnar, and Volar aspect of the wrist or forearm , specifically  the Radio-carpal Joint, Ulno-carpal Joint, and Distal Radio-Ulnar Joint is not tender to palpation,  the remainder of the intra-carpal joints are not tender to palpation. There is not clinical evidence of skeletal deformity or mal-rotation. Review of Electrodiagnostic Testing:  Electrodiagnostic Studies performed by another Physician outside of my practice were Personally Reviewed & Interpreted by myself today. Test performed on: 9/14/2022    NERVE CONDUCTION STUDY:  RIGHT   Median Nerve: Sensory Latency: 3.2  Motor Latency: 2.9  Ulnar Nerve:  Conduction Velocity:  53    LEFT  Median Nerve: Sensory Latency:     Motor Latency:     Ulnar Nerve:  Conduction Velocity:       EMG:  RIGHT  Normal    LEFT  Not Performed    My Interpretation: This study is consistent with: Nerve conduction studies were normal right side and Electromyography was normal right side      Radiographic Evaluation:  Radiographs, taken In My Office were Personally Reviewed & Interpreted by myself today (3 views of the right wrist). They demonstrate no evidence of an acute fracture of the distal radius, ulna, or carpal bones. There is no widening of the scapho-lunate interval &  no flexion of the scaphoid. There is no evidence of degenerative change at the Radio-carpal Joint, Ulno-carpal Joint, and Distal Radio-Ulnar Joint(s), all other aspects of the carpus are relatively spared. There is not evidence of other injury or bony fracture.   He has a slight incidental ulnar positive variance      Impression:  Mr. Payal Ying presents with volar forearm discomfort which seems most related to overuse or musculotendinous discomfort, which is intermittently exacerbated, and presents requesting further treatment. Plan:  I have had a thorough discussion with Mr. Julia Casey regarding the treatment options available for his initially presenting right  forearm discomfort which is causing him significant symptoms and difficulty. I have outlined for Mr. Julia Casey the risk, benefits and consequences of the various treatment modalities, including a reasonable expectation for the long term success of each. We have discussed the likelihood that further, more aggressive treatment may be required for his current presenting condition. Based upon our current discussion and a reasonable understating of the options available to him, Mr. Julia Casey has selected to proceed with a conservative plan of treatment consisting of: the use of protective splints, activity modification, and the judicious use of over-the-counter anti-inflammatory medications if allowed by his primary care physician. An appropriately sized Removable Carpal Tunnel Splint has been previously provided. Instructions were given regarding splint use and wear as well as suggestions for use of the other modalities were discussed. I have clearly explained to him that the above outlined treatment plan should not be expected to 'cure' his Wrist joint osteoarthritis, but we are rather treating the symptoms with which he presents. He has understood that in order to achieve more durable relief of his symptoms and to prevent future worsening or further damage, that definitive surgical treatment would be required. Mr. Julia Casey  voiced an appropriate understanding of our discussion, the options available to him, and of the expectations of his selected  treatment. I have also discussed with Mr. Julia Casey  the other treatment options available to him  for this condition.   We have today selected to proceed with conservative management. He and I have agreed that if our current course of conservative treatment does not prove to be effective over the short term future, that he will schedule a follow-up appointment to discuss and select an alternate course of therapy including possibly injection or surgical treatment. I have asked Mr. Alfaro Or  to feel free to contact me or schedule a follow-up appointment at any time that he feels the need for any further evaluation or treatment for his upper extremitiy condition. If he feels that he continues to be feeling and functioning well, he may choose not to seek any further follow-up or treatment at his discretion. I will remain available to continue his care at any time in the future.

## 2022-10-09 NOTE — PROGRESS NOTES
10/10/2022     Gage Adames (:  1995) is a 27 y.o. male, here for evaluation of the following medical concerns:    HPI  Wrist pain  Fiorella Gallegos presents today for evaluation of right-sided wrist pain. He was seen about 1 month ago complaining of aching pain in his right wrist which radiated to his thumb and pointer finger. It was thought this may be related to carpal tunnel syndrome. He was given some exercises and instructed to brace his wrist at night and during aggravating activities. Today he reports resolution of his symptoms. Review of Systems   Constitutional:  Negative for activity change, appetite change, fatigue and unexpected weight change. Eyes:  Negative for visual disturbance. Respiratory:  Negative for chest tightness and shortness of breath. Gastrointestinal:  Negative for abdominal distention, abdominal pain, diarrhea and nausea. Endocrine: Negative for polydipsia, polyphagia and polyuria. Genitourinary:  Negative for decreased urine volume, dysuria and frequency. Musculoskeletal:  Negative for arthralgias, gait problem and myalgias. Skin:  Negative for rash and wound. Neurological:  Negative for dizziness, weakness, light-headedness and numbness. Hematological:  Does not bruise/bleed easily. Prior to Visit Medications    Medication Sig Taking? Authorizing Provider   meloxicam (MOBIC) 15 MG tablet Take 1 tablet by mouth daily Yes Deep Simmons DO        Social History     Tobacco Use    Smoking status: Never    Smokeless tobacco: Never   Substance Use Topics    Alcohol use: Yes     Comment: occ        Vitals:    10/10/22 1241   BP: 113/65   Pulse: 55   Temp: 98 °F (36.7 °C)   TempSrc: Temporal   Weight: 163 lb (73.9 kg)   Height: 5' 10.5\" (1.791 m)     Estimated body mass index is 23.06 kg/m² as calculated from the following:    Height as of this encounter: 5' 10.5\" (1.791 m). Weight as of this encounter: 163 lb (73.9 kg).     Physical Exam  Constitutional: Appearance: Normal appearance. He is normal weight. HENT:      Head: Normocephalic and atraumatic. Nose: Nose normal.   Eyes:      Extraocular Movements: Extraocular movements intact. Conjunctiva/sclera: Conjunctivae normal.   Cardiovascular:      Rate and Rhythm: Normal rate. Pulmonary:      Effort: Pulmonary effort is normal.   Musculoskeletal:         General: No swelling, tenderness or deformity. Skin:     General: Skin is warm and dry. Neurological:      General: No focal deficit present. Mental Status: He is alert and oriented to person, place, and time. Mental status is at baseline. ASSESSMENT/PLAN:  1. Right carpal tunnel syndrome: Suspected diagnosis was accurate given he has noted resolution with bracing and exercises. Encouraged him to maintain the exercises least once daily to prevent recurrences. He can stop wearing the brace, but knows if symptoms return, he can resume nocturnal and activity mediated bracing. Follow-up as needed. Return if symptoms worsen or fail to improve. An electronic signature was used to authenticate this note.     --Alvarez Mooney, DO on 10/10/2022 at 1:03 PM

## 2022-10-10 ENCOUNTER — OFFICE VISIT (OUTPATIENT)
Dept: PRIMARY CARE CLINIC | Age: 27
End: 2022-10-10
Payer: COMMERCIAL

## 2022-10-10 VITALS
HEART RATE: 55 BPM | SYSTOLIC BLOOD PRESSURE: 113 MMHG | HEIGHT: 71 IN | DIASTOLIC BLOOD PRESSURE: 65 MMHG | WEIGHT: 163 LBS | TEMPERATURE: 98 F | BODY MASS INDEX: 22.82 KG/M2

## 2022-10-10 DIAGNOSIS — G56.01 RIGHT CARPAL TUNNEL SYNDROME: Primary | ICD-10-CM

## 2022-10-10 PROCEDURE — 99213 OFFICE O/P EST LOW 20 MIN: CPT | Performed by: STUDENT IN AN ORGANIZED HEALTH CARE EDUCATION/TRAINING PROGRAM

## 2022-10-10 PROCEDURE — 90471 IMMUNIZATION ADMIN: CPT | Performed by: STUDENT IN AN ORGANIZED HEALTH CARE EDUCATION/TRAINING PROGRAM

## 2022-10-10 PROCEDURE — 90674 CCIIV4 VAC NO PRSV 0.5 ML IM: CPT | Performed by: STUDENT IN AN ORGANIZED HEALTH CARE EDUCATION/TRAINING PROGRAM

## 2022-10-10 ASSESSMENT — ENCOUNTER SYMPTOMS
SHORTNESS OF BREATH: 0
DIARRHEA: 0
ABDOMINAL PAIN: 0
ABDOMINAL DISTENTION: 0
NAUSEA: 0
CHEST TIGHTNESS: 0

## 2024-01-29 ASSESSMENT — PATIENT HEALTH QUESTIONNAIRE - PHQ9
SUM OF ALL RESPONSES TO PHQ QUESTIONS 1-9: 0
SUM OF ALL RESPONSES TO PHQ9 QUESTIONS 1 & 2: 0
2. FEELING DOWN, DEPRESSED OR HOPELESS: 0
1. LITTLE INTEREST OR PLEASURE IN DOING THINGS: NOT AT ALL
SUM OF ALL RESPONSES TO PHQ QUESTIONS 1-9: 0
1. LITTLE INTEREST OR PLEASURE IN DOING THINGS: 0
2. FEELING DOWN, DEPRESSED OR HOPELESS: NOT AT ALL
SUM OF ALL RESPONSES TO PHQ9 QUESTIONS 1 & 2: 0

## 2024-02-01 ENCOUNTER — OFFICE VISIT (OUTPATIENT)
Dept: PRIMARY CARE CLINIC | Age: 29
End: 2024-02-01
Payer: COMMERCIAL

## 2024-02-01 VITALS
BODY MASS INDEX: 23.02 KG/M2 | HEIGHT: 71 IN | DIASTOLIC BLOOD PRESSURE: 66 MMHG | WEIGHT: 164.4 LBS | HEART RATE: 73 BPM | TEMPERATURE: 98.2 F | SYSTOLIC BLOOD PRESSURE: 130 MMHG

## 2024-02-01 DIAGNOSIS — Z00.00 ROUTINE GENERAL MEDICAL EXAMINATION AT A HEALTH CARE FACILITY: Primary | ICD-10-CM

## 2024-02-01 PROCEDURE — 99395 PREV VISIT EST AGE 18-39: CPT | Performed by: STUDENT IN AN ORGANIZED HEALTH CARE EDUCATION/TRAINING PROGRAM

## 2024-02-01 SDOH — ECONOMIC STABILITY: HOUSING INSECURITY
IN THE LAST 12 MONTHS, WAS THERE A TIME WHEN YOU DID NOT HAVE A STEADY PLACE TO SLEEP OR SLEPT IN A SHELTER (INCLUDING NOW)?: NO

## 2024-02-01 SDOH — ECONOMIC STABILITY: FOOD INSECURITY: WITHIN THE PAST 12 MONTHS, THE FOOD YOU BOUGHT JUST DIDN'T LAST AND YOU DIDN'T HAVE MONEY TO GET MORE.: NEVER TRUE

## 2024-02-01 SDOH — ECONOMIC STABILITY: INCOME INSECURITY: HOW HARD IS IT FOR YOU TO PAY FOR THE VERY BASICS LIKE FOOD, HOUSING, MEDICAL CARE, AND HEATING?: NOT HARD AT ALL

## 2024-02-01 SDOH — ECONOMIC STABILITY: FOOD INSECURITY: WITHIN THE PAST 12 MONTHS, YOU WORRIED THAT YOUR FOOD WOULD RUN OUT BEFORE YOU GOT MONEY TO BUY MORE.: NEVER TRUE

## 2024-02-01 ASSESSMENT — ENCOUNTER SYMPTOMS
BLOOD IN STOOL: 0
COUGH: 0
SHORTNESS OF BREATH: 0

## 2024-02-01 NOTE — PROGRESS NOTES
2024    Alden Arellano (:  1995) is a 28 y.o. male, here for evaluation of the following medical concerns:    HPI    Well Adult Physical: Patient here for a comprehensive physical exam.The patient reports no problems  Do you take any herbs or supplements that were not prescribed by a doctor? no Are you taking calcium supplements? not applicable Are you taking aspirin daily? not applicable    Sexual activity: single partner, contraception - condoms   Diet: Healthy  Exercise: aerobics 3 time(s) per week  Seatbelt use: yes    Review of Systems   Constitutional:  Negative for activity change, fatigue and unexpected weight change.   HENT:  Negative for hearing loss.    Eyes:  Negative for visual disturbance.   Respiratory:  Negative for cough and shortness of breath.    Cardiovascular:  Negative for chest pain and leg swelling.   Gastrointestinal:  Negative for blood in stool.   Endocrine: Negative for polydipsia and polyphagia.   Genitourinary:  Negative for dysuria, frequency, penile discharge, penile pain, scrotal swelling and testicular pain.   Musculoskeletal:  Negative for arthralgias.   Skin:  Negative for rash.   Allergic/Immunologic: Negative for environmental allergies.   Neurological:  Negative for dizziness and headaches.   Hematological:  Does not bruise/bleed easily.   Psychiatric/Behavioral:  Negative for dysphoric mood and sleep disturbance. The patient is not nervous/anxious.        Prior to Visit Medications    Not on File        No Known Allergies    History reviewed. No pertinent past medical history.    History reviewed. No pertinent surgical history.    Social History     Socioeconomic History    Marital status: Single     Spouse name: Not on file    Number of children: Not on file    Years of education: Not on file    Highest education level: Not on file   Occupational History    Not on file   Tobacco Use    Smoking status: Never    Smokeless tobacco: Never   Vaping Use    Vaping

## 2024-02-01 NOTE — PATIENT INSTRUCTIONS
Dr. Matthew Funes, S  Pediatric dentist  [address] ·   [phone]    [facility]  Pediatric dentist  [address]   [phone]    [facility]  Pediatric dentist  [address]  [phone]    Dr. Radha Coronel, [facility]  Pediatric dentist  [place], [place]  [phone]    Dr. Mukesh Stoner  Pediatric dentist  [place], [place]  [phone]    Raul HADDAD [facility]  Pediatric dentist  [address] · [place], [place]  [phone]    Dr. Nahed Abrams  Pediatric dentist  [place], [place]  [phone]    Dr. Luna Barron, [facility]  Pediatric dentist  [place], [place]  [phone]    Sangeeta Crook DMD  Pediatric dentist  [place], [place]  [phone]    Mahamed New  Pediatric dentist  [place], [place]  [phone]    Latesha Mcconnell  Pediatric dentist  [place]  [phone]    BIBIANA SAHU  Pediatric dentist  [place], [place]  [phone]    Orlando Wharton  Pediatric dentist  [place], [place]  [phone]

## 2025-02-25 ENCOUNTER — OFFICE VISIT (OUTPATIENT)
Dept: PRIMARY CARE CLINIC | Age: 30
End: 2025-02-25

## 2025-02-25 VITALS
HEART RATE: 64 BPM | TEMPERATURE: 98.1 F | BODY MASS INDEX: 24.3 KG/M2 | DIASTOLIC BLOOD PRESSURE: 68 MMHG | WEIGHT: 173.6 LBS | HEIGHT: 71 IN | SYSTOLIC BLOOD PRESSURE: 127 MMHG
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
## 2025-02-25 DIAGNOSIS — M54.2 NECK PAIN: ICD-10-CM

## 2025-02-25 DIAGNOSIS — S29.012A UPPER BACK STRAIN, INITIAL ENCOUNTER: ICD-10-CM

## 2025-02-25 RX ORDER — BACLOFEN 10 MG/1
10 TABLET ORAL 3 TIMES DAILY
Qty: 30 TABLET | Refills: 0 | Status: SHIPPED | OUTPATIENT
Start: 2025-02-25

## 2025-02-25 RX ORDER — NAPROXEN 500 MG/1
500 TABLET ORAL 2 TIMES DAILY WITH MEALS
Qty: 60 TABLET | Refills: 1 | Status: SHIPPED | OUTPATIENT
Start: 2025-02-25

## 2025-02-25 SDOH — ECONOMIC STABILITY: FOOD INSECURITY: WITHIN THE PAST 12 MONTHS, YOU WORRIED THAT YOUR FOOD WOULD RUN OUT BEFORE YOU GOT MONEY TO BUY MORE.: NEVER TRUE

## 2025-02-25 SDOH — ECONOMIC STABILITY: FOOD INSECURITY: WITHIN THE PAST 12 MONTHS, THE FOOD YOU BOUGHT JUST DIDN'T LAST AND YOU DIDN'T HAVE MONEY TO GET MORE.: NEVER TRUE

## 2025-02-25 ASSESSMENT — PATIENT HEALTH QUESTIONNAIRE - PHQ9
SUM OF ALL RESPONSES TO PHQ QUESTIONS 1-9: 0
2. FEELING DOWN, DEPRESSED OR HOPELESS: NOT AT ALL
SUM OF ALL RESPONSES TO PHQ9 QUESTIONS 1 & 2: 0
1. LITTLE INTEREST OR PLEASURE IN DOING THINGS: NOT AT ALL

## 2025-02-25 ASSESSMENT — ENCOUNTER SYMPTOMS: BACK PAIN: 1
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
## 2025-04-11 ENCOUNTER — OFFICE VISIT (OUTPATIENT)
Dept: PRIMARY CARE CLINIC | Age: 30
End: 2025-04-11

## 2025-04-11 VITALS
TEMPERATURE: 97.3 F | DIASTOLIC BLOOD PRESSURE: 75 MMHG | WEIGHT: 173.2 LBS | BODY MASS INDEX: 24.25 KG/M2 | SYSTOLIC BLOOD PRESSURE: 108 MMHG | HEIGHT: 71 IN | HEART RATE: 62 BPM

## 2025-04-11 DIAGNOSIS — M54.2 NECK PAIN: Primary | ICD-10-CM

## 2025-04-11 DIAGNOSIS — S29.012A UPPER BACK STRAIN, INITIAL ENCOUNTER: ICD-10-CM

## 2025-04-11 ASSESSMENT — ENCOUNTER SYMPTOMS: BACK PAIN: 0

## 2025-04-11 NOTE — PROGRESS NOTES
No swelling, tenderness or deformity. Normal range of motion.   Skin:     General: Skin is warm and dry.   Neurological:      Mental Status: He is alert.      Sensory: No sensory deficit.      Motor: No weakness.      Gait: Gait normal.      Deep Tendon Reflexes: Reflexes normal.   Psychiatric:         Behavior: Behavior normal.         ASSESSMENT/PLAN:  1. Neck pain  Appear resolved at this time.  Okay to return to work with no restrictions.    2. Upper back strain, initial encounter  As above      Return if symptoms worsen or fail to improve.    An electronic signature was used to authenticate this note.    --Nazario Lowe DO on 4/11/2025 at 1:33 PM